# Patient Record
Sex: FEMALE | Race: WHITE | NOT HISPANIC OR LATINO | Employment: FULL TIME | URBAN - METROPOLITAN AREA
[De-identification: names, ages, dates, MRNs, and addresses within clinical notes are randomized per-mention and may not be internally consistent; named-entity substitution may affect disease eponyms.]

---

## 2020-12-23 ENCOUNTER — IMMUNIZATION (OUTPATIENT)
Dept: VACCINE CLINIC | Facility: HOSPITAL | Age: 60
End: 2020-12-23

## 2020-12-23 PROCEDURE — 91300 HC SARSCOV02 VAC 30MCG/0.3ML IM: CPT

## 2020-12-23 PROCEDURE — 0001A: CPT

## 2021-01-13 ENCOUNTER — IMMUNIZATION (OUTPATIENT)
Dept: VACCINE CLINIC | Facility: HOSPITAL | Age: 61
End: 2021-01-13

## 2021-01-13 PROCEDURE — 91300 HC SARSCOV02 VAC 30MCG/0.3ML IM: CPT

## 2021-01-13 PROCEDURE — 0002A: CPT

## 2021-01-13 PROCEDURE — 0001A: CPT

## 2021-06-21 ENCOUNTER — HOSPITAL ENCOUNTER (OUTPATIENT)
Dept: GENERAL RADIOLOGY | Facility: HOSPITAL | Age: 61
Discharge: HOME | End: 2021-06-21

## 2021-06-21 ENCOUNTER — LAB (OUTPATIENT)
Dept: LAB | Facility: HOSPITAL | Age: 61
End: 2021-06-21

## 2021-06-21 ENCOUNTER — OFFICE VISIT (OUTPATIENT)
Dept: INTERNAL MEDICINE | Facility: CLINIC | Age: 61
End: 2021-06-21

## 2021-06-21 VITALS
OXYGEN SATURATION: 98 % | SYSTOLIC BLOOD PRESSURE: 126 MMHG | WEIGHT: 191.4 LBS | HEART RATE: 70 BPM | TEMPERATURE: 97.3 F | BODY MASS INDEX: 32.68 KG/M2 | DIASTOLIC BLOOD PRESSURE: 82 MMHG | HEIGHT: 64 IN

## 2021-06-21 DIAGNOSIS — E66.9 OBESITY (BMI 30-39.9): ICD-10-CM

## 2021-06-21 DIAGNOSIS — Z79.899 HIGH RISK MEDICATIONS (NOT ANTICOAGULANTS) LONG-TERM USE: ICD-10-CM

## 2021-06-21 DIAGNOSIS — Z12.11 COLON CANCER SCREENING: ICD-10-CM

## 2021-06-21 DIAGNOSIS — Z13.220 SCREENING CHOLESTEROL LEVEL: ICD-10-CM

## 2021-06-21 DIAGNOSIS — Z12.39 ENCOUNTER FOR SCREENING FOR MALIGNANT NEOPLASM OF BREAST, UNSPECIFIED SCREENING MODALITY: ICD-10-CM

## 2021-06-21 DIAGNOSIS — M25.561 CHRONIC PAIN OF RIGHT KNEE: ICD-10-CM

## 2021-06-21 DIAGNOSIS — M25.511 ACUTE PAIN OF RIGHT SHOULDER: ICD-10-CM

## 2021-06-21 DIAGNOSIS — E55.9 VITAMIN D DEFICIENCY: ICD-10-CM

## 2021-06-21 DIAGNOSIS — G89.29 CHRONIC PAIN OF RIGHT KNEE: Primary | ICD-10-CM

## 2021-06-21 DIAGNOSIS — G89.29 CHRONIC PAIN OF RIGHT KNEE: ICD-10-CM

## 2021-06-21 DIAGNOSIS — M25.561 CHRONIC PAIN OF RIGHT KNEE: Primary | ICD-10-CM

## 2021-06-21 LAB
25(OH)D3 SERPL-MCNC: 13.6 NG/ML
ALBUMIN SERPL-MCNC: 4.5 G/DL (ref 3.5–5.2)
ALBUMIN/GLOB SERPL: 1.2 G/DL
ALP SERPL-CCNC: 52 U/L (ref 39–117)
ALT SERPL W P-5'-P-CCNC: 16 U/L (ref 1–33)
ANION GAP SERPL CALCULATED.3IONS-SCNC: 10.4 MMOL/L (ref 5–15)
AST SERPL-CCNC: 19 U/L (ref 1–32)
BILIRUB SERPL-MCNC: 0.8 MG/DL (ref 0–1.2)
BUN SERPL-MCNC: 14 MG/DL (ref 6–20)
BUN/CREAT SERPL: 14.7 (ref 7–25)
CALCIUM SPEC-SCNC: 9.9 MG/DL (ref 8.6–10.5)
CHLORIDE SERPL-SCNC: 101 MMOL/L (ref 98–107)
CHOLEST SERPL-MCNC: 200 MG/DL (ref 0–200)
CO2 SERPL-SCNC: 25.6 MMOL/L (ref 22–29)
CREAT SERPL-MCNC: 0.95 MG/DL (ref 0.57–1)
DEPRECATED RDW RBC AUTO: 44.8 FL (ref 37–54)
ERYTHROCYTE [DISTWIDTH] IN BLOOD BY AUTOMATED COUNT: 14.6 % (ref 12.3–15.4)
GFR SERPL CREATININE-BSD FRML MDRD: 60 ML/MIN/1.73
GLOBULIN UR ELPH-MCNC: 3.7 GM/DL
GLUCOSE SERPL-MCNC: 95 MG/DL (ref 65–99)
HBA1C MFR BLD: 5.3 % (ref 4.8–5.6)
HCT VFR BLD AUTO: 46.2 % (ref 34–46.6)
HDLC SERPL-MCNC: 55 MG/DL (ref 40–60)
HGB BLD-MCNC: 14.5 G/DL (ref 12–15.9)
LDLC SERPL CALC-MCNC: 132 MG/DL (ref 0–100)
LDLC/HDLC SERPL: 2.37 {RATIO}
MCH RBC QN AUTO: 26.3 PG (ref 26.6–33)
MCHC RBC AUTO-ENTMCNC: 31.4 G/DL (ref 31.5–35.7)
MCV RBC AUTO: 83.8 FL (ref 79–97)
PLATELET # BLD AUTO: 245 10*3/MM3 (ref 140–450)
PMV BLD AUTO: 11.2 FL (ref 6–12)
POTASSIUM SERPL-SCNC: 4.5 MMOL/L (ref 3.5–5.2)
PROT SERPL-MCNC: 8.2 G/DL (ref 6–8.5)
RBC # BLD AUTO: 5.51 10*6/MM3 (ref 3.77–5.28)
SODIUM SERPL-SCNC: 137 MMOL/L (ref 136–145)
TRIGL SERPL-MCNC: 74 MG/DL (ref 0–150)
TSH SERPL DL<=0.05 MIU/L-ACNC: 1.23 UIU/ML (ref 0.27–4.2)
VLDLC SERPL-MCNC: 13 MG/DL (ref 5–40)
WBC # BLD AUTO: 6.95 10*3/MM3 (ref 3.4–10.8)

## 2021-06-21 PROCEDURE — 80053 COMPREHEN METABOLIC PANEL: CPT

## 2021-06-21 PROCEDURE — 73562 X-RAY EXAM OF KNEE 3: CPT

## 2021-06-21 PROCEDURE — 36415 COLL VENOUS BLD VENIPUNCTURE: CPT

## 2021-06-21 PROCEDURE — 80061 LIPID PANEL: CPT

## 2021-06-21 PROCEDURE — 99204 OFFICE O/P NEW MOD 45 MIN: CPT | Performed by: INTERNAL MEDICINE

## 2021-06-21 PROCEDURE — 83036 HEMOGLOBIN GLYCOSYLATED A1C: CPT

## 2021-06-21 PROCEDURE — 82306 VITAMIN D 25 HYDROXY: CPT

## 2021-06-21 PROCEDURE — 85027 COMPLETE CBC AUTOMATED: CPT

## 2021-06-21 PROCEDURE — 73030 X-RAY EXAM OF SHOULDER: CPT

## 2021-06-21 PROCEDURE — 84443 ASSAY THYROID STIM HORMONE: CPT

## 2021-06-21 RX ORDER — IBUPROFEN 600 MG/1
600 TABLET ORAL DAILY PRN
COMMUNITY
End: 2022-08-15 | Stop reason: HOSPADM

## 2021-06-21 NOTE — PROGRESS NOTES
Chief Complaint  Establish Care (new patient), Shoulder Pain (right shoulder pain sx x 4 - 6 weeks), and Knee Pain (right knee pain sx x 4-6 weeks)    Subjective          Alyssia Moody presents to [facility]  History of Present Illness    New pt here to establish. H/o:    R shoulder pain x 4-6 weeks at insertion of biceps. Pain w/ any ROM.  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  Fell off horse yesterday so aggravated it more. She is woorried about adhesive capsulitis, as she had that in her left shoulder and this feels very similar.  She did receive injections in her left shoulder but does not think she would like to do that on the right side.  Mainly she wants to get an x-ray to make sure there are no bony lesions  She had seen Dr. Welch for her left shoulder in the past.    R knee pain- medial tenderness- hurts to walk, especially initially, but then once she has walked a short distance the pain improves.  She does use a knee brace which helps.  Does have some nighttime pain as well.  She had left knee ACL repair years ago but she thinks she reinjured the ACL shortly after the surgery so she feels she is put more pressure on the right knee for many years because of the left knee problems    She uses ibuprofen 400 bid usually, occasionally 800 bid and this tends to work.  She has done this for many years    Mildly elevated blood pressure but has never had to be on blood pressure medication    Migraines - better w/ menopause, food related now, but does not get very often    She is not on any vitamins or supplements.     Went through menopause around age 51.  Previous Paps were normal but has been 5 or so years since her last one.  Also has not had a mammogram in about 5 years     Obesity -she has struggled with her weight on and off through the years and has been able to lose weight but then tends to regain.  Over the last 4  "to 6 weeks, she tries to walk 2miles a day, also rides horses regularly. Diet - not great.  drinks diet coke.   Has done wt watchers in past which was helpful        Current Outpatient Medications:   •  ibuprofen (ADVIL,MOTRIN) 600 MG tablet, 600 mg Daily As Needed., Disp: , Rfl:       Objective   Vital Signs:   /82 (BP Location: Left arm, Patient Position: Sitting)   Pulse 70   Temp 97.3 °F (36.3 °C) (Infrared)   Ht 162.1 cm (63.8\")   Wt 86.8 kg (191 lb 6.4 oz)   SpO2 98%   BMI 33.06 kg/m²       Physical exam  Constitutional: oriented to person, place, and time.  well-developed and well-nourished. No distress.   HENT:   Head: Normocephalic and atraumatic.   Eyes: Conjunctivae and EOM are normal.   Cardiovascular: Normal rate, regular rhythm and normal heart sounds.  Exam reveals no gallop and no friction rub.    No murmur heard.  Pulmonary/Chest: Effort normal and breath sounds normal. No respiratory distress.   no wheezes.   Neurological:  alert and oriented to person, place, and time.   MS: Right shoulder-tender at biceps insertion.  Very limited active and passive range of motion.  Right knee tender medially and patient unable to extend to 180 degrees  Skin: Skin is warm and dry. not diaphoretic.   Psychiatric:  normal mood and affect. behavior is normal. Judgment and thought content normal.      Result Review :                   Assessment and Plan    Diagnoses and all orders for this visit:    1. Chronic pain of right knee (Primary)  Comments:  We will check x-ray and discussed whether we should get her into orthopedics but she would like to see results of the x-ray first.  Orders:  -     XR Knee 3 View Right; Future    2. Acute pain of right shoulder  Comments:  Probable adhesive capsulitis.  Discussed Ortho referral but she wants to hold on this but get the x-ray  Orders:  -     XR Shoulder 2+ View Right; Future    3. Obesity (BMI 30-39.9)  Comments:  Discussed healthy diet and suggested trying " the No ap  Orders:  -     TSH Rfx On Abnormal To Free T4; Future  -     Lipid Panel; Future  -     Hemoglobin A1c; Future    4. Screening cholesterol level  -     Lipid Panel; Future    5. High risk medications (not anticoagulants) long-term use  -     Comprehensive Metabolic Panel; Future  -     CBC (No Diff); Future    6. Vitamin D deficiency  -     Vitamin D 25 Hydroxy; Future    7. Encounter for screening for malignant neoplasm of breast, unspecified screening modality  -     Mammo Screening Digital Tomosynthesis Bilateral With CAD; Future    8. Colon cancer screening  -     Cologuard - Stool, Per Rectum; Future        Follow Up   Return in about 6 months (around 12/21/2021) for Annual.  Patient was given instructions and counseling regarding her condition or for health maintenance advice. Please see specific information pulled into the AVS if appropriate.     Prev:  Soham- we will order  Pap smear-due and suggested she schedule here  Colonoscopy-she does not want to do a colonoscopy but is willing to do Cologuard.  We discussed this is not as good a test as a colonoscopy and she expresses understanding  Tetanus vaccine-she thinks she has had shot in the last 10 years  shingrix discussed -  can check at pharmacy since we do not have   She had the Covid vaccines

## 2021-07-28 ENCOUNTER — HOSPITAL ENCOUNTER (OUTPATIENT)
Dept: MAMMOGRAPHY | Facility: HOSPITAL | Age: 61
Discharge: HOME | End: 2021-07-28
Admitting: INTERNAL MEDICINE

## 2021-07-28 DIAGNOSIS — Z12.39 ENCOUNTER FOR SCREENING FOR MALIGNANT NEOPLASM OF BREAST, UNSPECIFIED SCREENING MODALITY: ICD-10-CM

## 2021-07-28 PROCEDURE — 77063 BREAST TOMOSYNTHESIS BI: CPT | Performed by: RADIOLOGY

## 2021-07-28 PROCEDURE — 77067 SCR MAMMO BI INCL CAD: CPT | Performed by: RADIOLOGY

## 2021-07-28 PROCEDURE — 77067 SCR MAMMO BI INCL CAD: CPT

## 2021-07-28 PROCEDURE — 77063 BREAST TOMOSYNTHESIS BI: CPT

## 2021-12-18 NOTE — PROGRESS NOTES
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 Knees have improved.     R frozen shoulder - has improved some    Vitamin D deficiecny- her D was 13 in 6/21. She is not on any vit D          Current Outpatient Medications:   •  ibuprofen (ADVIL,MOTRIN) 200 MG tablet, Take 600 mg by mouth Every 6 (Six) Hours As Needed for Mild Pain ., Disp: , Rfl:   •  ibuprofen (ADVIL,MOTRIN) 600 MG tablet, 600 mg Daily As Needed., Disp: , Rfl:     The following portions of the patient's history were reviewed and updated as appropriate: allergies, current medications, past family history, past medical history, past social history, past surgical history and problem list.    Review of Systems   Constitutional: Positive for unexpected weight loss (intentional). Negative for activity change, appetite change, fever and unexpected weight gain.   HENT: Negative.    Eyes: Negative.    Respiratory: Negative for shortness of breath and wheezing.    Cardiovascular: Negative for chest pain, palpitations and leg swelling.   Gastrointestinal: Negative.    Endocrine: Negative.    Genitourinary: Negative for difficulty urinating, dysuria and urinary incontinence.   Musculoskeletal: Positive for arthralgias.   Skin: Negative.    Allergic/Immunologic: Negative for immunocompromised state.   Neurological: Negative for seizures, speech difficulty, memory problem and confusion.   Hematological: Does not bruise/bleed easily.   Psychiatric/Behavioral: Negative for agitation.         Objective    /72 (BP Location: Right arm, Patient Position: Sitting)   Pulse 80   Temp 97.1 °F (36.2 °C) (Infrared)   Ht 162.6 cm (64\")   Wt 78.9 kg (174 lb)   SpO2 99%   BMI 29.87 kg/m²   Physical Exam   Physical Exam  Vitals and nursing note reviewed.   Constitutional: "       General: She is not in acute distress.     Appearance: She is well-developed. She is not diaphoretic.   HENT:      Head: Normocephalic and atraumatic.      Right Ear: External ear normal.      Left Ear: External ear normal.      Nose: Nose normal.      Mouth/Throat:      Pharynx: No oropharyngeal exudate.   Eyes:      General: No scleral icterus.        Right eye: No discharge.         Left eye: No discharge.      Conjunctiva/sclera: Conjunctivae normal.      Pupils: Pupils are equal, round, and reactive to light.   Neck:      Thyroid: No thyromegaly.   Cardiovascular:      Rate and Rhythm: Normal rate and regular rhythm.      Heart sounds: Normal heart sounds. No murmur heard.  No friction rub. No gallop.    Pulmonary:      Effort: Pulmonary effort is normal. No respiratory distress.      Breath sounds: Normal breath sounds. No wheezing or rales.   Chest:   Breasts:      Right: No mass, nipple discharge, skin change or tenderness.      Left: No mass, nipple discharge, skin change or tenderness.       Abdominal:      General: Bowel sounds are normal. There is no distension.      Palpations: Abdomen is soft. There is no mass.      Tenderness: There is no abdominal tenderness. There is no guarding or rebound.   Genitourinary:     Vagina: Normal. No vaginal discharge.      Rectum: Guaiac result negative.   Musculoskeletal:         General: No deformity. Normal range of motion.      Cervical back: Normal range of motion and neck supple.   Lymphadenopathy:      Cervical: No cervical adenopathy.   Skin:     General: Skin is warm and dry.      Coloration: Skin is not pale.      Findings: No erythema or rash.   Neurological:      Mental Status: She is alert and oriented to person, place, and time.      Coordination: Coordination normal.      Deep Tendon Reflexes: Reflexes normal.   Psychiatric:         Behavior: Behavior normal.         Thought Content: Thought content normal.         Judgment: Judgment normal.            Assessment/Plan   Diagnoses and all orders for this visit:    1. Routine general medical examination at a health care facility (Primary)/Routine gynecological examination  -     POC Occult Blood Stool  -     Liquid-based Pap Smear, Screening; Future  Regular exercise/healthy diet. BSE q month. Sunscreen use encouraged. calcium intake reviewed.   Soham due 7/22  Colon - she had cologuard this year, repeat in 3 yrs  DT -she has had in the last 10 yrs  DEXA due, has never had, we will schedule  Shingles - shingrix discussed -  can check at pharmacy since we do not have   covid-had booster  Flu- had  She will see derm for skin checkup, lots of sun exposure when she was younger  -     POC Urinalysis Dipstick, Automated  -     POC Occult Blood Stool  -     Liquid-based Pap Smear, Screening; Future    2. Vitamin D deficiency- would start 2000 iu of D3 daily

## 2021-12-23 ENCOUNTER — OFFICE VISIT (OUTPATIENT)
Dept: INTERNAL MEDICINE | Facility: CLINIC | Age: 61
End: 2021-12-23

## 2021-12-23 VITALS
BODY MASS INDEX: 29.71 KG/M2 | TEMPERATURE: 97.1 F | WEIGHT: 174 LBS | HEIGHT: 64 IN | HEART RATE: 80 BPM | DIASTOLIC BLOOD PRESSURE: 72 MMHG | SYSTOLIC BLOOD PRESSURE: 116 MMHG | OXYGEN SATURATION: 99 %

## 2021-12-23 DIAGNOSIS — Z00.00 ROUTINE GENERAL MEDICAL EXAMINATION AT A HEALTH CARE FACILITY: Primary | ICD-10-CM

## 2021-12-23 DIAGNOSIS — Z13.820 SCREENING FOR OSTEOPOROSIS: ICD-10-CM

## 2021-12-23 DIAGNOSIS — E55.9 VITAMIN D DEFICIENCY: ICD-10-CM

## 2021-12-23 DIAGNOSIS — Z01.419 ROUTINE GYNECOLOGICAL EXAMINATION: ICD-10-CM

## 2021-12-23 LAB
BILIRUB BLD-MCNC: NEGATIVE MG/DL
CLARITY, POC: CLEAR
COLOR UR: YELLOW
DEVELOPER EXPIRATION DATE: 0
DEVELOPER LOT NUMBER: 0
EXPIRATION DATE: 0
EXPIRATION DATE: ABNORMAL
FECAL OCCULT BLOOD SCREEN, POC: NEGATIVE
GLUCOSE UR STRIP-MCNC: NEGATIVE MG/DL
KETONES UR QL: NEGATIVE
LEUKOCYTE EST, POC: NEGATIVE
Lab: 0
Lab: ABNORMAL
NEGATIVE CONTROL: NEGATIVE
NITRITE UR-MCNC: NEGATIVE MG/ML
PH UR: 5.5 [PH] (ref 5–8)
POSITIVE CONTROL: POSITIVE
PROT UR STRIP-MCNC: NEGATIVE MG/DL
RBC # UR STRIP: ABNORMAL /UL
SP GR UR: 1.03 (ref 1–1.03)
UROBILINOGEN UR QL: NORMAL

## 2021-12-23 PROCEDURE — 99396 PREV VISIT EST AGE 40-64: CPT | Performed by: INTERNAL MEDICINE

## 2021-12-23 PROCEDURE — 82270 OCCULT BLOOD FECES: CPT | Performed by: INTERNAL MEDICINE

## 2021-12-23 PROCEDURE — 81003 URINALYSIS AUTO W/O SCOPE: CPT | Performed by: INTERNAL MEDICINE

## 2021-12-23 RX ORDER — IBUPROFEN 200 MG
800 TABLET ORAL 2 TIMES DAILY
COMMUNITY

## 2022-04-01 ENCOUNTER — HOSPITAL ENCOUNTER (OUTPATIENT)
Dept: BONE DENSITY | Facility: HOSPITAL | Age: 62
Discharge: HOME | End: 2022-04-01
Admitting: INTERNAL MEDICINE

## 2022-04-01 DIAGNOSIS — Z13.820 SCREENING FOR OSTEOPOROSIS: ICD-10-CM

## 2022-04-01 PROCEDURE — 77080 DXA BONE DENSITY AXIAL: CPT

## 2022-08-14 ENCOUNTER — APPOINTMENT (OUTPATIENT)
Dept: CT IMAGING | Facility: HOSPITAL | Age: 62
End: 2022-08-14

## 2022-08-14 ENCOUNTER — HOSPITAL ENCOUNTER (OUTPATIENT)
Facility: HOSPITAL | Age: 62
Setting detail: OBSERVATION
Discharge: HOME | End: 2022-08-15
Attending: EMERGENCY MEDICINE | Admitting: INTERNAL MEDICINE
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
## 2022-08-14 DIAGNOSIS — Z86.79 HISTORY OF HYPERTENSION: ICD-10-CM

## 2022-08-14 DIAGNOSIS — S00.83XA CONTUSION OF FACE, INITIAL ENCOUNTER: ICD-10-CM

## 2022-08-14 DIAGNOSIS — R55 VASOVAGAL SYNCOPE: ICD-10-CM

## 2022-08-14 DIAGNOSIS — S06.5X0A SUBDURAL HEMATOMA DUE TO CONCUSSION, WITHOUT LOSS OF CONSCIOUSNESS, INITIAL ENCOUNTER: ICD-10-CM

## 2022-08-14 DIAGNOSIS — S30.0XXA TRAUMATIC HEMATOMA OF BUTTOCK, INITIAL ENCOUNTER: ICD-10-CM

## 2022-08-14 DIAGNOSIS — S09.90XA TRAUMATIC INJURY OF HEAD, INITIAL ENCOUNTER: ICD-10-CM

## 2022-08-14 DIAGNOSIS — S10.93XA CONTUSION OF NECK, INITIAL ENCOUNTER: ICD-10-CM

## 2022-08-14 PROBLEM — R07.89 MUSCULOSKELETAL CHEST PAIN: Status: ACTIVE | Noted: 2022-08-14

## 2022-08-14 PROBLEM — T14.8XXA HEMATOMA OF MUSCLE: Status: ACTIVE | Noted: 2022-08-14

## 2022-08-14 PROBLEM — S06.5XAA SDH (SUBDURAL HEMATOMA): Status: ACTIVE | Noted: 2022-08-14

## 2022-08-14 LAB
ALBUMIN SERPL-MCNC: 4.6 G/DL (ref 3.5–5.2)
ALBUMIN/GLOB SERPL: 1.4 G/DL
ALP SERPL-CCNC: 58 U/L (ref 39–117)
ALT SERPL W P-5'-P-CCNC: 19 U/L (ref 1–33)
ANION GAP SERPL CALCULATED.3IONS-SCNC: 13 MMOL/L (ref 5–15)
AST SERPL-CCNC: 24 U/L (ref 1–32)
BASOPHILS # BLD AUTO: 0.04 10*3/MM3 (ref 0–0.2)
BASOPHILS NFR BLD AUTO: 0.2 % (ref 0–1.5)
BILIRUB SERPL-MCNC: 0.5 MG/DL (ref 0–1.2)
BUN SERPL-MCNC: 23 MG/DL (ref 8–23)
BUN/CREAT SERPL: 23 (ref 7–25)
CALCIUM SPEC-SCNC: 9.3 MG/DL (ref 8.6–10.5)
CHLORIDE SERPL-SCNC: 105 MMOL/L (ref 98–107)
CO2 SERPL-SCNC: 20 MMOL/L (ref 22–29)
CREAT SERPL-MCNC: 1 MG/DL (ref 0.57–1)
DEPRECATED RDW RBC AUTO: 42.6 FL (ref 37–54)
EGFRCR SERPLBLD CKD-EPI 2021: 64.2 ML/MIN/1.73
EOSINOPHIL # BLD AUTO: 0.01 10*3/MM3 (ref 0–0.4)
EOSINOPHIL NFR BLD AUTO: 0.1 % (ref 0.3–6.2)
ERYTHROCYTE [DISTWIDTH] IN BLOOD BY AUTOMATED COUNT: 14.5 % (ref 12.3–15.4)
GLOBULIN UR ELPH-MCNC: 3.3 GM/DL
GLUCOSE SERPL-MCNC: 119 MG/DL (ref 65–99)
HCT VFR BLD AUTO: 34.8 % (ref 34–46.6)
HCT VFR BLD AUTO: 40 % (ref 34–46.6)
HGB BLD-MCNC: 11.6 G/DL (ref 12–15.9)
HGB BLD-MCNC: 13.1 G/DL (ref 12–15.9)
IMM GRANULOCYTES # BLD AUTO: 0.13 10*3/MM3 (ref 0–0.05)
IMM GRANULOCYTES NFR BLD AUTO: 0.8 % (ref 0–0.5)
LYMPHOCYTES # BLD AUTO: 1.41 10*3/MM3 (ref 0.7–3.1)
LYMPHOCYTES NFR BLD AUTO: 8.5 % (ref 19.6–45.3)
MCH RBC QN AUTO: 26.6 PG (ref 26.6–33)
MCHC RBC AUTO-ENTMCNC: 32.8 G/DL (ref 31.5–35.7)
MCV RBC AUTO: 81.1 FL (ref 79–97)
MONOCYTES # BLD AUTO: 0.79 10*3/MM3 (ref 0.1–0.9)
MONOCYTES NFR BLD AUTO: 4.8 % (ref 5–12)
NEUTROPHILS NFR BLD AUTO: 14.25 10*3/MM3 (ref 1.7–7)
NEUTROPHILS NFR BLD AUTO: 85.6 % (ref 42.7–76)
NRBC BLD AUTO-RTO: 0 /100 WBC (ref 0–0.2)
PLATELET # BLD AUTO: 235 10*3/MM3 (ref 140–450)
PMV BLD AUTO: 9.7 FL (ref 6–12)
POTASSIUM SERPL-SCNC: 4 MMOL/L (ref 3.5–5.2)
PROT SERPL-MCNC: 7.9 G/DL (ref 6–8.5)
RBC # BLD AUTO: 4.93 10*6/MM3 (ref 3.77–5.28)
SODIUM SERPL-SCNC: 138 MMOL/L (ref 136–145)
TROPONIN T SERPL-MCNC: <0.01 NG/ML (ref 0–0.03)
WBC NRBC COR # BLD: 16.63 10*3/MM3 (ref 3.4–10.8)

## 2022-08-14 PROCEDURE — 72128 CT CHEST SPINE W/O DYE: CPT

## 2022-08-14 PROCEDURE — 85025 COMPLETE CBC W/AUTO DIFF WBC: CPT | Performed by: PHYSICIAN ASSISTANT

## 2022-08-14 PROCEDURE — 85018 HEMOGLOBIN: CPT | Performed by: PHYSICIAN ASSISTANT

## 2022-08-14 PROCEDURE — G0378 HOSPITAL OBSERVATION PER HR: HCPCS

## 2022-08-14 PROCEDURE — 72131 CT LUMBAR SPINE W/O DYE: CPT

## 2022-08-14 PROCEDURE — 72125 CT NECK SPINE W/O DYE: CPT

## 2022-08-14 PROCEDURE — 84484 ASSAY OF TROPONIN QUANT: CPT | Performed by: PHYSICIAN ASSISTANT

## 2022-08-14 PROCEDURE — 25010000002 IOPAMIDOL 61 % SOLUTION: Performed by: EMERGENCY MEDICINE

## 2022-08-14 PROCEDURE — 70450 CT HEAD/BRAIN W/O DYE: CPT

## 2022-08-14 PROCEDURE — 70486 CT MAXILLOFACIAL W/O DYE: CPT

## 2022-08-14 PROCEDURE — 85014 HEMATOCRIT: CPT | Performed by: PHYSICIAN ASSISTANT

## 2022-08-14 PROCEDURE — 99220 PR INITIAL OBSERVATION CARE/DAY 70 MINUTES: CPT | Performed by: FAMILY MEDICINE

## 2022-08-14 PROCEDURE — 93005 ELECTROCARDIOGRAM TRACING: CPT | Performed by: PHYSICIAN ASSISTANT

## 2022-08-14 PROCEDURE — 36415 COLL VENOUS BLD VENIPUNCTURE: CPT

## 2022-08-14 PROCEDURE — 74177 CT ABD & PELVIS W/CONTRAST: CPT

## 2022-08-14 PROCEDURE — 71260 CT THORAX DX C+: CPT

## 2022-08-14 PROCEDURE — 80053 COMPREHEN METABOLIC PANEL: CPT | Performed by: PHYSICIAN ASSISTANT

## 2022-08-14 PROCEDURE — 99284 EMERGENCY DEPT VISIT MOD MDM: CPT

## 2022-08-14 RX ORDER — SODIUM CHLORIDE 0.9 % (FLUSH) 0.9 %
10 SYRINGE (ML) INJECTION AS NEEDED
Status: DISCONTINUED | OUTPATIENT
Start: 2022-08-14 | End: 2022-08-15 | Stop reason: HOSPADM

## 2022-08-14 RX ADMIN — IOPAMIDOL 95 ML: 612 INJECTION, SOLUTION INTRAVENOUS at 21:19

## 2022-08-14 NOTE — ED PROVIDER NOTES
Subjective   This is a 62-year-old female that presents the ER after traumatic fall off of a horse approximately 3 hours ago.  Patient says that the horse was attempting to make a jump and then stopped abruptly.  Patient was thrown off the horse and landed on her back and then proceeded to strike her head on the hard ground.  She was wearing a helmet and denied any loss of consciousness.  Patient reports low back pain as well as pain to the right buttocks and her right buttocks is significantly enlarged compared to the left with tightness and induration.  She also has some bruising to the mid forehead and some localized tenderness to the anterior chest wall.  She denies any pain to the upper or lower extremities.  She reports a stinging sensation to her pelvis and buttocks and when that sensation occurs, she rates her pain an 8 out of 10.  She is able to ambulate.  She denies any urinary or bowel changes or incontinence.  Patient denies any abdominal pain.  She does not take any daily aspirin or other chronic anticoagulation.  She does take ibuprofen intermittently for pain related to osteoarthritis.  Past medical history is significant for osteoarthritis, depression, migraines, hypertension, and GERD.  Patient denies any pain with movement of the jaw but feels like her teeth are not coming together correctly.      History provided by:  Patient and spouse  Trauma  Mechanism of injury: Fall off of a horse and fall  Incident location: outdoors  Time since incident: 3 hours  Arrived directly from scene: no     Fall:       Fall occurred: Follow occurred when patient was riding a horse and it attempted to jump but then stopped abruptly.  Patient was thrown off the horse and landed onto her back and right side.       Height of fall: 5 feet       Impact surface: The hard ground.       Point of impact: back and head (Patient hit her back and right buttocks and then proceeded to strike her head against the  ground)    Protective equipment:        Helmet.        Suspicion of alcohol use: no       Suspicion of drug use: no    EMS/PTA data:       Bystander interventions: none       Ambulatory at scene: yes       Blood loss: none       Oriented to: person       Loss of consciousness: no       Amnesic to event: no       Airway interventions: none       Breathing interventions: none       IV access: none       IO access: none       Cardiac interventions: none       Medications administered: none    Current symptoms:       Quality: Patient describes a stinging sensation to her pelvis and buttocks.       Associated symptoms:             Reports back pain (Mid and lower back pain as well as pelvic pain) and chest pain (Anterior chest pain near the sternum).             Denies abdominal pain, difficulty breathing, headache, loss of consciousness, nausea, neck pain, seizures and vomiting.     Relevant PMH:       Medical risk factors:             No chronic anticoagulation.  Patient takes ibuprofen routinely for pain related to osteoarthritis.       Pharmacological risk factors:             No anticoagulation therapy.       Review of Systems   Constitutional: Negative.  Negative for chills and fatigue.   HENT: Negative.    Eyes: Negative.  Negative for visual disturbance.   Respiratory: Negative.  Negative for chest tightness and shortness of breath.    Cardiovascular: Positive for chest pain (Anterior chest pain near the sternum). Negative for palpitations and leg swelling.   Gastrointestinal: Negative.  Negative for abdominal distention, abdominal pain, nausea and vomiting.   Genitourinary: Negative.  Negative for enuresis and flank pain.   Musculoskeletal: Positive for arthralgias (Righ pelvic pain and pain to the right buttocks.  Pain to the anterior chest wall.) and back pain (Mid and lower back pain as well as pelvic pain). Negative for neck pain.   Skin: Positive for color change (Patient has bruising to the mid forehead as  well as significant swelling to the right buttocks as compared to the left with firmness and induration, suspect hematoma.).   Neurological: Negative.  Negative for dizziness, seizures, loss of consciousness, syncope, facial asymmetry, speech difficulty and headaches.   All other systems reviewed and are negative.      Past Medical History:   Diagnosis Date   • Acid reflux    • Depression    • Fracture     right elbow and left tibial plateau   • H/O mammogram    • Hypertension    • Migraine    • Obesity    • Osteoarthritis    • Pap smear for cervical cancer screening     been about 5 years or more       No Known Allergies    Past Surgical History:   Procedure Laterality Date   • KNEE ARTHROSCOPY Bilateral    • KNEE SURGERY Left ,        Family History   Problem Relation Age of Onset   • Arthritis Mother    • Migraines Mother    • Stroke Mother    • Dementia Mother 75        Lewy Body,  age 80   • Breast cancer Sister 55   • Diabetes Maternal Grandfather    • Heart attack Maternal Grandfather    • Arthritis Maternal Grandmother    • Ovarian cancer Neg Hx        Social History     Socioeconomic History   • Marital status:    Tobacco Use   • Smoking status: Never Smoker   • Smokeless tobacco: Never Used   Substance and Sexual Activity   • Alcohol use: Not Currently     Comment: rare   • Drug use: Never   • Sexual activity: Defer           Objective   Physical Exam  Vitals and nursing note reviewed.   Constitutional:       General: She is not in acute distress.     Appearance: Normal appearance.      Comments: Patient appears uncomfortable in a seated position due to significant swelling and suspected hematoma of the right buttocks.   HENT:      Head: Normocephalic. Contusion present. No abrasion.      Jaw: There is normal jaw occlusion. No tenderness, pain on movement or malocclusion.        Comments: No sign of scalp hematoma or swelling.  Scalp is nontender to palpation.  There is a small  superficial bruise to the mid forehead which patient says is from striking the horse.  She was wearing a helmet.  No pops or clicks or evidence of malocclusion with palpation of bilateral TMJs, although patient says she does not feel like her teeth come together appropriately.  There is no obvious abnormality.     Right Ear: Tympanic membrane normal.      Left Ear: Tympanic membrane normal.      Nose: Nose normal.      Mouth/Throat:      Mouth: Mucous membranes are moist.      Pharynx: Oropharynx is clear.   Eyes:      Extraocular Movements: Extraocular movements intact.      Right eye: Normal extraocular motion and no nystagmus.      Left eye: Normal extraocular motion and no nystagmus.      Conjunctiva/sclera: Conjunctivae normal.      Pupils: Pupils are equal, round, and reactive to light.      Comments: Pupils equal round reactive to light.  Extraocular movements intact.   Neck:        Comments: No particular C-spine tenderness.  Full range of motion.  Cardiovascular:      Rate and Rhythm: Regular rhythm. Tachycardia present.  No extrasystoles are present.     Pulses: Normal pulses.      Heart sounds: Normal heart sounds.      Comments: Tachycardia.  No ectopy.  Pulmonary:      Effort: Pulmonary effort is normal. No tachypnea or accessory muscle usage.      Breath sounds: Normal breath sounds. No decreased air movement. No decreased breath sounds.      Comments: No tachypnea or retractions.  Lungs are clear to auscultation bilaterally.  No decreased breath sounds concerning for pneumothorax.  Chest:      Chest wall: Tenderness present. No deformity, swelling or crepitus.       Abdominal:      General: Bowel sounds are normal. There is no distension. There are no signs of injury.      Palpations: Abdomen is soft.      Tenderness: There is no abdominal tenderness. There is no right CVA tenderness, left CVA tenderness, guarding or rebound.      Comments: Abdomen soft and nontender.  No flank or CVA tenderness.  No  bruising to the abdominal wall.  Abdominal exam is benign.   Musculoskeletal:         General: Normal range of motion.      Cervical back: Normal range of motion and neck supple. No spinous process tenderness or muscular tenderness.      Comments: There is no C-spine tenderness.  Full range of motion.  Mild mid to lower thoracic spinal tenderness.  No bruising or sign of trauma.  Tenderness to the mid lower lumbar spine and right buttocks.  Large swelling and induration to the right buttocks as compared to the left, suspect large hematoma.  No tenderness to the right or left hip or groin.  Full range of motion all 4 extremities without any bony tenderness.   Skin:     General: Skin is warm and dry.      Findings: Bruising and ecchymosis present.          Neurological:      General: No focal deficit present.      Mental Status: She is alert.      Cranial Nerves: Cranial nerves are intact.      Sensory: Sensation is intact.      Motor: Motor function is intact.      Coordination: Coordination is intact.      Comments: Neuro intact and nonfocal.         Critical Care  Performed by: Yelena Faith PA-C  Authorized by: Martha Zuniga MD     Critical care provider statement:     Critical care time (minutes):  40    Critical care time was exclusive of:  Separately billable procedures and treating other patients    Critical care was necessary to treat or prevent imminent or life-threatening deterioration of the following conditions:  CNS failure or compromise and trauma    Critical care was time spent personally by me on the following activities:  Blood draw for specimens, development of treatment plan with patient or surrogate, discussions with consultants, examination of patient, obtaining history from patient or surrogate, review of old charts, re-evaluation of patient's condition, pulse oximetry, ordering and review of radiographic studies, ordering and review of laboratory studies, ordering and performing  treatments and interventions and evaluation of patient's response to treatment               ED Course  ED Course as of 08/15/22 0044   Sun Aug 14, 2022   2003 Discussed the case with Dr. Zuniga, ER attending physician.  We will proceed with full trauma work-up. [FC]   2225 Discussed abnormal results with Radiologist on CT of the head without contrast. [FC]   2226 Discussed the case with Raciel Barry, Neurosurgery PA.  He is reviewing films. [FC]   2246 Pt updated on all results and recommendations for admission.  She deferred on admission but then she had episode of decreased responsiveness.  HR went to 42.  /123.  Patient came to after about 15 seconds.  Dr. Zuniga came to the bedside.  Second IV was established.  Repeat H&H will be drawn. [FC]   2250 Repeat BP 94/67.  HR 68 now.  Giving NS IV fluid bolus.  Will prepare patient for admission and contact Dr. Borjas, hospitalist. [FC]   2347 Repeat H&H is 11.6 and 34.8 from 13 and 40.  Suspect that this is related to large hematoma to the right buttocks. [FC]   Mon Aug 15, 2022   0036 Discussed admission with Dr. Borjas, hospitalist.  Patient receiving IV fluids and upon reassessment, she is feeling markedly improved.  Suspect vasovagal syncope with hypotension and clinical presentation.  Patient is now agreeable to admission.  Neurosurgery will consult due to small subdural hematoma.  Patient is ready for admission on telemetry. [FC]   0040 CT of the C, T, and LS spine revealed no acute compression fracture or subluxation.  CT of the chest with contrast revealed no acute intrathoracic injury or pneumothorax.  CT of the abdomen/pelvis with contrast revealed no acute intra-abdominal or pelvic organ injury.  There was evidence of subcutaneous hematoma to the right buttocks.  CT of the facial bones revealed no acute facial bone fracture.  There was no malalignment of the jaw. [FC]      ED Course User Index  [FC] Yelena Faith, PAYunielC           Recent Results (from the  past 24 hour(s))   Comprehensive Metabolic Panel    Collection Time: 08/14/22  8:09 PM    Specimen: Blood   Result Value Ref Range    Glucose 119 (H) 65 - 99 mg/dL    BUN 23 8 - 23 mg/dL    Creatinine 1.00 0.57 - 1.00 mg/dL    Sodium 138 136 - 145 mmol/L    Potassium 4.0 3.5 - 5.2 mmol/L    Chloride 105 98 - 107 mmol/L    CO2 20.0 (L) 22.0 - 29.0 mmol/L    Calcium 9.3 8.6 - 10.5 mg/dL    Total Protein 7.9 6.0 - 8.5 g/dL    Albumin 4.60 3.50 - 5.20 g/dL    ALT (SGPT) 19 1 - 33 U/L    AST (SGOT) 24 1 - 32 U/L    Alkaline Phosphatase 58 39 - 117 U/L    Total Bilirubin 0.5 0.0 - 1.2 mg/dL    Globulin 3.3 gm/dL    A/G Ratio 1.4 g/dL    BUN/Creatinine Ratio 23.0 7.0 - 25.0    Anion Gap 13.0 5.0 - 15.0 mmol/L    eGFR 64.2 >60.0 mL/min/1.73   Troponin    Collection Time: 08/14/22  8:09 PM    Specimen: Blood   Result Value Ref Range    Troponin T <0.010 0.000 - 0.030 ng/mL   CBC Auto Differential    Collection Time: 08/14/22  8:09 PM    Specimen: Blood   Result Value Ref Range    WBC 16.63 (H) 3.40 - 10.80 10*3/mm3    RBC 4.93 3.77 - 5.28 10*6/mm3    Hemoglobin 13.1 12.0 - 15.9 g/dL    Hematocrit 40.0 34.0 - 46.6 %    MCV 81.1 79.0 - 97.0 fL    MCH 26.6 26.6 - 33.0 pg    MCHC 32.8 31.5 - 35.7 g/dL    RDW 14.5 12.3 - 15.4 %    RDW-SD 42.6 37.0 - 54.0 fl    MPV 9.7 6.0 - 12.0 fL    Platelets 235 140 - 450 10*3/mm3    Neutrophil % 85.6 (H) 42.7 - 76.0 %    Lymphocyte % 8.5 (L) 19.6 - 45.3 %    Monocyte % 4.8 (L) 5.0 - 12.0 %    Eosinophil % 0.1 (L) 0.3 - 6.2 %    Basophil % 0.2 0.0 - 1.5 %    Immature Grans % 0.8 (H) 0.0 - 0.5 %    Neutrophils, Absolute 14.25 (H) 1.70 - 7.00 10*3/mm3    Lymphocytes, Absolute 1.41 0.70 - 3.10 10*3/mm3    Monocytes, Absolute 0.79 0.10 - 0.90 10*3/mm3    Eosinophils, Absolute 0.01 0.00 - 0.40 10*3/mm3    Basophils, Absolute 0.04 0.00 - 0.20 10*3/mm3    Immature Grans, Absolute 0.13 (H) 0.00 - 0.05 10*3/mm3    nRBC 0.0 0.0 - 0.2 /100 WBC   ECG 12 Lead    Collection Time: 08/14/22  8:13 PM   Result  Value Ref Range    QT Interval 342 ms    QTC Interval 429 ms   Hemoglobin & Hematocrit, Blood    Collection Time: 08/14/22 11:11 PM    Specimen: Blood   Result Value Ref Range    Hemoglobin 11.6 (L) 12.0 - 15.9 g/dL    Hematocrit 34.8 34.0 - 46.6 %     Note: In addition to lab results from this visit, the labs listed above may include labs taken at another facility or during a different encounter within the last 24 hours. Please correlate lab times with ED admission and discharge times for further clarification of the services performed during this visit.    CT Chest With Contrast Diagnostic   Final Result   Impression:   1. Right gluteal contusion and subcutaneous hematoma.   2. No other acute traumatic injury.   3. Diverticulosis without acute diverticulitis.      Electronically signed by:  Giovani Inman M.D.     8/14/2022 7:54 PM Mountain Time      CT Abdomen Pelvis With Contrast   Final Result   Impression:   1. Right gluteal contusion and subcutaneous hematoma.   2. No other acute traumatic injury.   3. Diverticulosis without acute diverticulitis.      Electronically signed by:  Giovani Inman M.D.     8/14/2022 7:54 PM Mountain Time      CT Head Without Contrast   Final Result      Head CT:       1.  Thin (4 mm) subdural hematoma along the posterior falx without significant mass effect.          Facial CT:      1.  No acute facial fracture.         Cervical spine CT:      1.  No acute fracture or malalignment in the cervical spine.          Results were discussed with ELIE HUGO via phone at 8/14/2022 8:11 PM by Dr. Alves.  A read back of two patient identifiers and impression occurred.      Electronically signed by:  Jeff Alves     8/14/2022 8:20 PM Mountain Time      CT Facial Bones Without Contrast   Final Result      Head CT:       1.  Thin (4 mm) subdural hematoma along the posterior falx without significant mass effect.          Facial CT:      1.  No acute facial fracture.         Cervical  spine CT:      1.  No acute fracture or malalignment in the cervical spine.          Results were discussed with ELIE HUGO via phone at 8/14/2022 8:11 PM by Dr. Alves.  A read back of two patient identifiers and impression occurred.      Electronically signed by:  Jeff Alves     8/14/2022 8:20 PM Mountain Time      CT Cervical Spine Without Contrast   Final Result      Head CT:       1.  Thin (4 mm) subdural hematoma along the posterior falx without significant mass effect.          Facial CT:      1.  No acute facial fracture.         Cervical spine CT:      1.  No acute fracture or malalignment in the cervical spine.          Results were discussed with ELIE HUGO via phone at 8/14/2022 8:11 PM by Dr. Alves.  A read back of two patient identifiers and impression occurred.      Electronically signed by:  Jeff Alves     8/14/2022 8:20 PM Mountain Time      CT Thoracic Spine Without Contrast   Final Result      No acute fracture or traumatic subluxation in the thoracic spine.               Electronically signed by:  Maynor Hernandez M.D.     8/14/2022 8:02 PM Mountain Time      CT Lumbar Spine Without Contrast   Final Result      No acute fracture or traumatic subluxation in the lumbar spine.      Electronically signed by:  Maynor Hernandez M.D.     8/14/2022 8:08 PM Mountain Time        Vitals:    08/14/22 2345 08/14/22 2346 08/14/22 2347 08/15/22 0009   BP: 116/65   121/64   BP Location:    Right arm   Patient Position:    Lying   Pulse:  70 80 75   Resp:    18   Temp:    97.6 °F (36.4 °C)   TempSrc:    Oral   SpO2:   96% 95%   Weight:       Height:         Medications   sodium chloride 0.9 % flush 10 mL (has no administration in time range)   sodium chloride 0.9 % flush 10 mL (10 mL Intravenous Not Given 8/15/22 0016)   sodium chloride 0.9 % flush 10 mL (has no administration in time range)   aluminum-magnesium hydroxide-simethicone (MAALOX MAX) 400-400-40 MG/5ML suspension 15 mL (has no  administration in time range)   ondansetron (ZOFRAN) tablet 4 mg (has no administration in time range)     Or   ondansetron (ZOFRAN) injection 4 mg (has no administration in time range)   sodium chloride 0.9 % bolus 1,000 mL (1,000 mL Intravenous New Bag 8/15/22 0032)   iopamidol (ISOVUE-300) 61 % injection 100 mL (95 mL Intravenous Given 8/14/22 2119)     ECG/EMG Results (last 24 hours)     ** No results found for the last 24 hours. **        ECG 12 Lead   Preliminary Result   Test Reason : chest wall pain s/p trauma   Blood Pressure :   */*   mmHG   Vent. Rate :  95 BPM     Atrial Rate :  95 BPM      P-R Int : 112 ms          QRS Dur :  84 ms       QT Int : 342 ms       P-R-T Axes :  10  41  22 degrees      QTc Int : 429 ms      Normal sinus rhythm   Normal ECG   No previous ECGs available      Referred By: EDMD           Confirmed By:                                           MDM    Final diagnoses:   Fall from horse, initial encounter   Traumatic injury of head, initial encounter   Subdural hematoma due to concussion, without loss of consciousness, initial encounter ([facility])   Traumatic hematoma of buttock, initial encounter   Contusion of neck, initial encounter   Contusion of face, initial encounter   History of hypertension   Vasovagal syncope       ED Disposition  ED Disposition     ED Disposition   Decision to Admit    Condition   --    Comment   Level of Care: Telemetry [5]   Diagnosis: SDH (subdural hematoma) ([facility]) [[id number]]   Admitting Physician: YENI VILLANUEVA [[id number]]   Attending Physician: YENI VILLANUEVA [[id number]]               No follow-up provider specified.       Medication List      No changes were made to your prescriptions during this visit.          Yelena Faith PA-C  08/15/22 0044

## 2022-08-15 ENCOUNTER — READMISSION MANAGEMENT (OUTPATIENT)
Dept: CALL CENTER | Facility: HOSPITAL | Age: 62
End: 2022-08-15

## 2022-08-15 VITALS
HEART RATE: 88 BPM | TEMPERATURE: 97.9 F | OXYGEN SATURATION: 94 % | DIASTOLIC BLOOD PRESSURE: 71 MMHG | BODY MASS INDEX: 29.02 KG/M2 | SYSTOLIC BLOOD PRESSURE: 125 MMHG | WEIGHT: 170 LBS | HEIGHT: 64 IN | RESPIRATION RATE: 18 BRPM

## 2022-08-15 PROBLEM — D62 ACUTE BLOOD LOSS ANEMIA: Status: ACTIVE | Noted: 2022-08-15

## 2022-08-15 LAB
HCT VFR BLD AUTO: 32.4 % (ref 34–46.6)
HGB BLD-MCNC: 10.3 G/DL (ref 12–15.9)
SARS-COV-2 RNA PNL SPEC NAA+PROBE: NOT DETECTED

## 2022-08-15 PROCEDURE — C9803 HOPD COVID-19 SPEC COLLECT: HCPCS | Performed by: FAMILY MEDICINE

## 2022-08-15 PROCEDURE — 99217 PR OBSERVATION CARE DISCHARGE MANAGEMENT: CPT | Performed by: INTERNAL MEDICINE

## 2022-08-15 PROCEDURE — U0004 COV-19 TEST NON-CDC HGH THRU: HCPCS | Performed by: FAMILY MEDICINE

## 2022-08-15 PROCEDURE — 99203 OFFICE O/P NEW LOW 30 MIN: CPT | Performed by: PHYSICIAN ASSISTANT

## 2022-08-15 PROCEDURE — G0378 HOSPITAL OBSERVATION PER HR: HCPCS

## 2022-08-15 PROCEDURE — 85014 HEMATOCRIT: CPT | Performed by: FAMILY MEDICINE

## 2022-08-15 PROCEDURE — 85018 HEMOGLOBIN: CPT | Performed by: FAMILY MEDICINE

## 2022-08-15 RX ORDER — TROLAMINE SALICYLATE 10 G/100G
1 CREAM TOPICAL 4 TIMES DAILY PRN
Status: DISCONTINUED | OUTPATIENT
Start: 2022-08-15 | End: 2022-08-15 | Stop reason: HOSPADM

## 2022-08-15 RX ORDER — OXYCODONE HYDROCHLORIDE 5 MG/1
5 TABLET ORAL EVERY 4 HOURS PRN
Status: DISCONTINUED | OUTPATIENT
Start: 2022-08-15 | End: 2022-08-15 | Stop reason: HOSPADM

## 2022-08-15 RX ORDER — ONDANSETRON 2 MG/ML
4 INJECTION INTRAMUSCULAR; INTRAVENOUS EVERY 6 HOURS PRN
Status: DISCONTINUED | OUTPATIENT
Start: 2022-08-15 | End: 2022-08-15 | Stop reason: HOSPADM

## 2022-08-15 RX ORDER — SODIUM CHLORIDE 0.9 % (FLUSH) 0.9 %
10 SYRINGE (ML) INJECTION EVERY 12 HOURS SCHEDULED
Status: DISCONTINUED | OUTPATIENT
Start: 2022-08-15 | End: 2022-08-15 | Stop reason: HOSPADM

## 2022-08-15 RX ORDER — TRAMADOL HYDROCHLORIDE 50 MG/1
75 TABLET ORAL 3 TIMES DAILY
Status: DISCONTINUED | OUTPATIENT
Start: 2022-08-15 | End: 2022-08-15 | Stop reason: HOSPADM

## 2022-08-15 RX ORDER — ONDANSETRON 4 MG/1
4 TABLET, FILM COATED ORAL EVERY 6 HOURS PRN
Status: DISCONTINUED | OUTPATIENT
Start: 2022-08-15 | End: 2022-08-15 | Stop reason: HOSPADM

## 2022-08-15 RX ORDER — ALUMINA, MAGNESIA, AND SIMETHICONE 2400; 2400; 240 MG/30ML; MG/30ML; MG/30ML
15 SUSPENSION ORAL EVERY 6 HOURS PRN
Status: DISCONTINUED | OUTPATIENT
Start: 2022-08-15 | End: 2022-08-15 | Stop reason: HOSPADM

## 2022-08-15 RX ORDER — MORPHINE SULFATE 2 MG/ML
2 INJECTION, SOLUTION INTRAMUSCULAR; INTRAVENOUS EVERY 4 HOURS PRN
Status: DISCONTINUED | OUTPATIENT
Start: 2022-08-15 | End: 2022-08-15 | Stop reason: HOSPADM

## 2022-08-15 RX ORDER — ACETAMINOPHEN 325 MG/1
650 TABLET ORAL EVERY 6 HOURS PRN
Status: DISCONTINUED | OUTPATIENT
Start: 2022-08-15 | End: 2022-08-15 | Stop reason: HOSPADM

## 2022-08-15 RX ORDER — ACETAMINOPHEN 500 MG
1000 TABLET ORAL 3 TIMES DAILY
Status: DISCONTINUED | OUTPATIENT
Start: 2022-08-15 | End: 2022-08-15 | Stop reason: HOSPADM

## 2022-08-15 RX ORDER — SODIUM CHLORIDE 0.9 % (FLUSH) 0.9 %
10 SYRINGE (ML) INJECTION AS NEEDED
Status: DISCONTINUED | OUTPATIENT
Start: 2022-08-15 | End: 2022-08-15 | Stop reason: HOSPADM

## 2022-08-15 RX ADMIN — Medication 10 ML: at 09:15

## 2022-08-15 RX ADMIN — SODIUM CHLORIDE 1000 ML: 9 INJECTION, SOLUTION INTRAVENOUS at 00:32

## 2022-08-15 RX ADMIN — TRAMADOL HYDROCHLORIDE 75 MG: 50 TABLET ORAL at 09:11

## 2022-08-15 RX ADMIN — ACETAMINOPHEN 650 MG: 325 TABLET, FILM COATED ORAL at 05:07

## 2022-08-15 NOTE — NURSING NOTE
Pt arrived to unit in stable condition, vital sings stable, no confusion, nausea, blurry vision, sob. Bolus 0.9 NS running as directed. Will monitor patient overnight for any changes.     Thank you,     LS

## 2022-08-15 NOTE — OUTREACH NOTE
Prep Survey    Flowsheet Row Responses   [facility] facility patient discharged from? [facility]   Is LACE score < 7 ? Yes   Emergency Room discharge w/ pulse ox? No   Eligibility [facility]   Date of Admission 08/14/22   Date of Discharge 08/15/22   Discharge Disposition Home or Self Care   Discharge diagnosis Subdural hematoma   Does the patient have one of the following disease processes/diagnoses(primary or secondary)? Other   Does the patient have Home health ordered? No   Is there a DME ordered? No   Prep survey completed? Yes          MYRNA FLORES - Registered Nurse

## 2022-08-15 NOTE — H&P
[facility] Medicine Services  HISTORY AND PHYSICAL    Patient Name: Alyssia Moody  : 1960  MRN: [id number]  Primary Care Physician: Hailee Doss MD  Date of admission: 2022      Subjective   Subjective     Chief Complaint:  Fall form horse    HPI:  Alyssia Moody is a 62 y.o. female presented to [facility] ED after acute mechanical fall from horse during riding.  Patient was thrown onto her back, hit occiput to ground without LOC, immediately had stinging and pain in R>L buttock area.  No SOA, loss of bladder/bowel, numbness or focal weakness, vision changes.  Currently just feels very stiff/sore.  Has forehead contusion and chin contusion from horse.     ED evaluation with 4mm SDH without neurologic deficit, right gluteal hematoma, and reactive leukocytosis. Presyncopal spell while in ED, no prodrome and VSS.     Review of Systems   Gen- No fevers, chills, HA, uncontrolled pain  CV- No substernal chest pain, palpitations, new edema  Resp- No SOA, cough  GI- No N/V/D    All other systems reviewed and are negative.     Personal History     Past Medical History:   Diagnosis Date   • Acid reflux    • Depression    • Fracture     right elbow and left tibial plateau   • H/O mammogram    • Hypertension    • Migraine    • Obesity    • Osteoarthritis    • Pap smear for cervical cancer screening     been about 5 years or more             Past Surgical History:   Procedure Laterality Date   • KNEE ARTHROSCOPY Bilateral    • KNEE SURGERY Left ,        Family History:  family history includes Arthritis in her maternal grandmother and mother; Breast cancer (age of onset: 55) in her sister; Dementia (age of onset: 75) in her mother; Diabetes in her maternal grandfather; Heart attack in her maternal grandfather; Migraines in her mother; Stroke in her mother. Otherwise pertinent FHx was reviewed and unremarkable.     Social History:  reports that she has never smoked. She has  never used smokeless tobacco. She reports previous alcohol use. She reports that she does not use drugs.  Social History     Social History Narrative   • Not on file       Medications:  Available home medication information reviewed.  (Not in a hospital admission)      No Known Allergies    Objective   Objective     Vital Signs:   Temp:  [97.8 °F (36.6 °C)] 97.8 °F (36.6 °C)  Heart Rate:  [] 80  Resp:  [18] 18  BP: ()/(60-95) 101/60       Physical Exam   Constitutional: No acute distress, awake, alert, nontoxic  Eyes:No conjunctival hemmorrhage  HENT: mid forehead soft tissue contusion, early ecchymosis of left inframandibular area  Respiratory: On RA, good effort, nonlabored respirations   Cardiovascular: NSR tele  Musculoskeletal: No peripheral edema, normal muscle tone for age  Psychiatric: Appropriate affect, good insight and judgement, cooperative  Neurologic: Oriented x 3, movements symmetric BUE and BLE, Cranial Nerves grossly intact, speech clear and fluent      LAB RESULTS:      Lab 08/14/22 2009   WBC 16.63*   HEMOGLOBIN 13.1   HEMATOCRIT 40.0   PLATELETS 235   NEUTROS ABS 14.25*   IMMATURE GRANS (ABS) 0.13*   LYMPHS ABS 1.41   MONOS ABS 0.79   EOS ABS 0.01   MCV 81.1         Lab 08/14/22 2009   SODIUM 138   POTASSIUM 4.0   CHLORIDE 105   CO2 20.0*   ANION GAP 13.0   BUN 23   CREATININE 1.00   EGFR 64.2   GLUCOSE 119*   CALCIUM 9.3         Lab 08/14/22 2009   TOTAL PROTEIN 7.9   ALBUMIN 4.60   GLOBULIN 3.3   ALT (SGPT) 19   AST (SGOT) 24   BILIRUBIN 0.5   ALK PHOS 58         Lab 08/14/22 2009   TROPONIN T <0.010                 UA    Urinalysis 12/23/21   Ketones, UA Negative   Leukocytes, UA Negative             Microbiology Results (last 10 days)     ** No results found for the last 240 hours. **          CT Head Without Contrast    Result Date: 8/14/2022  EXAMINATION: CT HEAD WO CONTRAST, CT CERVICAL SPINE WO CONTRAST, CT FACIAL BONES WO CONTRAST DATE: 8/14/2022 9:04 PM  INDICATION: Head  trauma, thrown off horse  COMPARISON: None available. TECHNIQUE: Thin section noncontrast axial images were obtained through the head. Coronal reformatted images were created. CT dose lowering techniques were used, to include: automated exposure control, adjustment for patient size, and or use of iterative reconstruction FINDINGS: Bones and extracranial soft tissues: Calvarium is intact. Paranasal sinuses and mastoid air cells are essentially clear. Intracranial contents: Thin subdural hematoma along the posterior falx measuring 4 mm in thickness. Gray-white differentiation is preserved. There is age-appropriate whole brain atrophy.  Basal cisterns are patent. No hemorrhage, extra-axial collection, or hydrocephalus. No CT evidence of acute ischemia. No mass or mass effect. TECHNIQUE: Thin section axial noncontrast images were obtained through the facial bones.  Sagittal and coronal reformatted images were created.  Images were reviewed in bone and soft tissue windows.  CT dose lowering techniques were used, to include: automated exposure control, adjustment for patient size, and or use of iterative reconstruction. FINDINGS: Bones: No evidence of acute facial fracture. Mandible is intact and the condyles located. Soft tissues: Facial soft tissues are unremarkable. Globes and orbits are grossly normal. TECHNIQUE: Thin section axial noncontrast images were obtained through the cervical spine.  Sagittal and coronal reformatted images were created.  Images were reviewed in bone and soft tissue windows. CT dose lowering techniques were used, to include: automated exposure control, adjustment for patient size, and or use of iterative reconstruction. FINDINGS:  Vertebral column: Straightening of the normal cervical lordosis with mild degenerative subluxations. Alignment of the craniocervical junction is preserved. No acute fracture. Vertebral body heights are maintained. Normal bone mineralization. Multilevel cervical  spondylosis worse at C5-6 where uncovertebral joint hypertrophy contributes to moderate left-sided neural foraminal stenosis. No significant bony spinal canal stenosis. Soft tissues: Lung apices are clear. Cervical soft tissues are unremarkable.     Impression: Head CT: 1.  Thin (4 mm) subdural hematoma along the posterior falx without significant mass effect. Facial CT: 1.  No acute facial fracture. Cervical spine CT: 1.  No acute fracture or malalignment in the cervical spine. Results were discussed with ELIE HUGO via phone at 8/14/2022 8:11 PM by Dr. Alves.  A read back of two patient identifiers and impression occurred. Electronically signed by:  Jeff Alves  8/14/2022 8:20 PM Mountain Time    CT Chest With Contrast Diagnostic    Result Date: 8/14/2022  CT CHEST, ABDOMEN AND PELVIS WITH CONTRAST CLINICAL INDICATION: Trauma. COMPARISON: None. TECHNIQUE: 95 cc Isovue-300 administered intravenously. CT images of the chest, abdomen and pelvis were obtained. CT dose lowering techniques were used, to include: automated exposure control, adjustment for patient size, and or use of iterative reconstruction. FINDINGS: Cardiovascular: Heart size normal without pericardial effusion. Mediastinal vessels normal in caliber and patency. Mediastinum: No mediastinal or hilar lymphadenopathy. Trachea and central airways are patent. Thyroid is normal. Esophagus is normal. Lungs and pleura: Lungs are clear without focal consolidation, pleural effusion or pneumothorax. Liver and biliary system: The liver is normal in size and configuration without focal lesion. No intra or extrahepatic biliary ductal dilatation is noted. The gallbladder is normal without stones, wall thickening or adjacent fluid. Pancreas: The pancreas is unremarkable. Spleen: The spleen is normal. Adrenals: The adrenal glands are within normal limits. Kidneys: The kidneys are symmetric in size and enhancement and without focal lesion or hydronephrosis.  Gastrointestinal: The stomach and scattered loops of small and large bowel have a nonobstructive pattern. Diverticulosis. Mesentery and retroperitoneum: No mesenteric or retroperitoneal adenopathy. No abnormal fluid collection, mass or free air. Pelvis: The urinary bladder is moderately distended with a smooth contour. Rectum is normal. No free fluid. No deep pelvic or inguinal adenopathy. Reproductive: No acute abnormality. Body wall: Subcutaneous contusion and hyperdense fluid are seen in the right gluteal region. Bones: No acute osseous abnormality. Thoracolumbar vertebral body height, alignment and disc spaces are maintained throughout.     Impression: Impression: 1. Right gluteal contusion and subcutaneous hematoma. 2. No other acute traumatic injury. 3. Diverticulosis without acute diverticulitis. Electronically signed by:  Giovani Inman M.D.  8/14/2022 7:54 PM Mountain Time    CT Cervical Spine Without Contrast    Result Date: 8/14/2022  EXAMINATION: CT HEAD WO CONTRAST, CT CERVICAL SPINE WO CONTRAST, CT FACIAL BONES WO CONTRAST DATE: 8/14/2022 9:04 PM  INDICATION: Head trauma, thrown off horse  COMPARISON: None available. TECHNIQUE: Thin section noncontrast axial images were obtained through the head. Coronal reformatted images were created. CT dose lowering techniques were used, to include: automated exposure control, adjustment for patient size, and or use of iterative reconstruction FINDINGS: Bones and extracranial soft tissues: Calvarium is intact. Paranasal sinuses and mastoid air cells are essentially clear. Intracranial contents: Thin subdural hematoma along the posterior falx measuring 4 mm in thickness. Gray-white differentiation is preserved. There is age-appropriate whole brain atrophy.  Basal cisterns are patent. No hemorrhage, extra-axial collection, or hydrocephalus. No CT evidence of acute ischemia. No mass or mass effect. TECHNIQUE: Thin section axial noncontrast images were obtained through  the facial bones.  Sagittal and coronal reformatted images were created.  Images were reviewed in bone and soft tissue windows.  CT dose lowering techniques were used, to include: automated exposure control, adjustment for patient size, and or use of iterative reconstruction. FINDINGS: Bones: No evidence of acute facial fracture. Mandible is intact and the condyles located. Soft tissues: Facial soft tissues are unremarkable. Globes and orbits are grossly normal. TECHNIQUE: Thin section axial noncontrast images were obtained through the cervical spine.  Sagittal and coronal reformatted images were created.  Images were reviewed in bone and soft tissue windows. CT dose lowering techniques were used, to include: automated exposure control, adjustment for patient size, and or use of iterative reconstruction. FINDINGS:  Vertebral column: Straightening of the normal cervical lordosis with mild degenerative subluxations. Alignment of the craniocervical junction is preserved. No acute fracture. Vertebral body heights are maintained. Normal bone mineralization. Multilevel cervical spondylosis worse at C5-6 where uncovertebral joint hypertrophy contributes to moderate left-sided neural foraminal stenosis. No significant bony spinal canal stenosis. Soft tissues: Lung apices are clear. Cervical soft tissues are unremarkable.     Impression: Head CT: 1.  Thin (4 mm) subdural hematoma along the posterior falx without significant mass effect. Facial CT: 1.  No acute facial fracture. Cervical spine CT: 1.  No acute fracture or malalignment in the cervical spine. Results were discussed with ELIE HUGO via phone at 8/14/2022 8:11 PM by Dr. Alves.  A read back of two patient identifiers and impression occurred. Electronically signed by:  Jeff Alves  8/14/2022 8:20 PM Mountain Time    CT Thoracic Spine Without Contrast    Result Date: 8/14/2022  EXAMINATION: CT THORACIC SPINE WO CONTRAST  8/14/2022 9:04 PM INDICATIONS: Back  pain, thrown from horse COMPARISON: None TECHNIQUE: Axial noncontrast imaging throughout the thoracic spine followed by coronal and sagittal reformats. CT dose lowering techniques were used, to include: automated exposure control, adjustment for patient size, and/or use of iterative reconstruction. FINDINGS: Vertebral body height and alignment is preserved. Normal thoracic kyphosis is maintained. Mild generalized osteopenia. Minimal degenerative changes. No high-grade canal stenosis.     Impression: No acute fracture or traumatic subluxation in the thoracic spine. Electronically signed by:  Maynor Hernandez M.D.  8/14/2022 8:02 PM Mountain Time    CT Lumbar Spine Without Contrast    Result Date: 8/14/2022  EXAMINATION: CT of the lumbar spine without contrast DATE: 8/14/2022 9:04 PM INDICATION: Low back pain, thrown from horse COMPARISON: None TECHNIQUE: Thin section axial noncontrast images were obtained through the lumbar spine.  Sagittal and coronal reformatted images were created.  Images were reviewed in bone and soft tissue windows.  CT dose lowering techniques were used, to include: automated exposure control, adjustment for patient size, and or use of iterative reconstruction.? FINDINGS: 5 nonrib-bearing lumbar type vertebral bodies. Normal vertebral body height. Normal alignment. Mild osteopenia. Normal lumbar lordosis is maintained. No acute fracture or traumatic subluxation. DEGENERATIVE CHANGES: Mild degenerative disc disease and facet arthropathy. No high-grade canal stenosis. VISUALIZED INTRA-ABDOMINAL COMPARTMENT: No acute abnormality.     Impression: No acute fracture or traumatic subluxation in the lumbar spine. Electronically signed by:  Maynor Hernandez M.D.  8/14/2022 8:08 PM Mountain Time    CT Abdomen Pelvis With Contrast    Result Date: 8/14/2022  CT CHEST, ABDOMEN AND PELVIS WITH CONTRAST CLINICAL INDICATION: Trauma. COMPARISON: None. TECHNIQUE: 95 cc Isovue-300 administered intravenously. CT  images of the chest, abdomen and pelvis were obtained. CT dose lowering techniques were used, to include: automated exposure control, adjustment for patient size, and or use of iterative reconstruction. FINDINGS: Cardiovascular: Heart size normal without pericardial effusion. Mediastinal vessels normal in caliber and patency. Mediastinum: No mediastinal or hilar lymphadenopathy. Trachea and central airways are patent. Thyroid is normal. Esophagus is normal. Lungs and pleura: Lungs are clear without focal consolidation, pleural effusion or pneumothorax. Liver and biliary system: The liver is normal in size and configuration without focal lesion. No intra or extrahepatic biliary ductal dilatation is noted. The gallbladder is normal without stones, wall thickening or adjacent fluid. Pancreas: The pancreas is unremarkable. Spleen: The spleen is normal. Adrenals: The adrenal glands are within normal limits. Kidneys: The kidneys are symmetric in size and enhancement and without focal lesion or hydronephrosis. Gastrointestinal: The stomach and scattered loops of small and large bowel have a nonobstructive pattern. Diverticulosis. Mesentery and retroperitoneum: No mesenteric or retroperitoneal adenopathy. No abnormal fluid collection, mass or free air. Pelvis: The urinary bladder is moderately distended with a smooth contour. Rectum is normal. No free fluid. No deep pelvic or inguinal adenopathy. Reproductive: No acute abnormality. Body wall: Subcutaneous contusion and hyperdense fluid are seen in the right gluteal region. Bones: No acute osseous abnormality. Thoracolumbar vertebral body height, alignment and disc spaces are maintained throughout.     Impression: Impression: 1. Right gluteal contusion and subcutaneous hematoma. 2. No other acute traumatic injury. 3. Diverticulosis without acute diverticulitis. Electronically signed by:  Giovani Inman M.D.  8/14/2022 7:54 PM Mountain Time    CT Facial Bones Without  Contrast    Result Date: 8/14/2022  EXAMINATION: CT HEAD WO CONTRAST, CT CERVICAL SPINE WO CONTRAST, CT FACIAL BONES WO CONTRAST DATE: 8/14/2022 9:04 PM  INDICATION: Head trauma, thrown off horse  COMPARISON: None available. TECHNIQUE: Thin section noncontrast axial images were obtained through the head. Coronal reformatted images were created. CT dose lowering techniques were used, to include: automated exposure control, adjustment for patient size, and or use of iterative reconstruction FINDINGS: Bones and extracranial soft tissues: Calvarium is intact. Paranasal sinuses and mastoid air cells are essentially clear. Intracranial contents: Thin subdural hematoma along the posterior falx measuring 4 mm in thickness. Gray-white differentiation is preserved. There is age-appropriate whole brain atrophy.  Basal cisterns are patent. No hemorrhage, extra-axial collection, or hydrocephalus. No CT evidence of acute ischemia. No mass or mass effect. TECHNIQUE: Thin section axial noncontrast images were obtained through the facial bones.  Sagittal and coronal reformatted images were created.  Images were reviewed in bone and soft tissue windows.  CT dose lowering techniques were used, to include: automated exposure control, adjustment for patient size, and or use of iterative reconstruction. FINDINGS: Bones: No evidence of acute facial fracture. Mandible is intact and the condyles located. Soft tissues: Facial soft tissues are unremarkable. Globes and orbits are grossly normal. TECHNIQUE: Thin section axial noncontrast images were obtained through the cervical spine.  Sagittal and coronal reformatted images were created.  Images were reviewed in bone and soft tissue windows. CT dose lowering techniques were used, to include: automated exposure control, adjustment for patient size, and or use of iterative reconstruction. FINDINGS:  Vertebral column: Straightening of the normal cervical lordosis with mild degenerative  subluxations. Alignment of the craniocervical junction is preserved. No acute fracture. Vertebral body heights are maintained. Normal bone mineralization. Multilevel cervical spondylosis worse at C5-6 where uncovertebral joint hypertrophy contributes to moderate left-sided neural foraminal stenosis. No significant bony spinal canal stenosis. Soft tissues: Lung apices are clear. Cervical soft tissues are unremarkable.     Impression: Head CT: 1.  Thin (4 mm) subdural hematoma along the posterior falx without significant mass effect. Facial CT: 1.  No acute facial fracture. Cervical spine CT: 1.  No acute fracture or malalignment in the cervical spine. Results were discussed with ELIE HUGO via phone at 8/14/2022 8:11 PM by Dr. Alves.  A read back of two patient identifiers and impression occurred. Electronically signed by:  Jeff Alves  8/14/2022 8:20 PM Mountain Time          Assessment & Plan   Assessment & Plan     Active Hospital Problems    Diagnosis  POA   • SDH (subdural hematoma) (HCC) [S06.5X9A]  Yes   • Hematoma of gluteal muscle [T14.8XXA]  Yes   • Fall from horse [V80.010A]  Not Applicable   • Musculoskeletal chest pain [R07.89]  Yes       S/p fall from horse  SDH  Right gluteal hematoma  -repeat non-con CT head tomorrow afternoon for comparison  -q4h neuro checks  -not chronically anticoagulated  -supportive care for hematoma and musculoskeletal pain  -- TID scheduled HD Tylenol/Ultram, prn oxycodone/aspercreme    Vasovagal episode in ED  -situational, do not feel related to SDH  -IVF's overnight  -orthostatic vitals in am     **Plan for d/c home tomorrow if comparison CT head acceptable**      DVT prophylaxis:  SCDs      CODE STATUS:  FULL  There are no questions and answers to display.         Darlene Borjas MD  08/14/22

## 2022-08-15 NOTE — CONSULTS
Consults    Referring Provider: Jeffrey HOPE    Patient Care Team:  Hailee Doss MD as PCP - General (Internal Medicine)    Chief Complaint: Low back pain right SI pain    Subjective .     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  Patient is an experience horseback rider but was trying to train a new horse that halted before going over a jump which threw her forward hitting the horse and causing some trauma on her forehead and chin.  Patient fell off the horse and fell flat on her back and hit the back of her head.  Secondary to the rapidly evolving hematoma in the right SI area patient was brought to the emergency department.  Injury happened at 4 the CT scan was done 6 hours after the initial injury.  This showed a minuscule falx subdural hematoma.    I discussed with the ED that she likely could go home because it is so small and we had a remote scan with her being totally neurologically intact but she had a vasovagal in the ED and was then admitted.    Upon evaluation today she denies any headache or neurologic symptom.  Patient does complain of some right SI pain where she has a pretty large bruise and some swelling she feels as though she likely dislocated her SI joint which I think is a reasonable assessment considering her fall.     Other than the subdural noted on the CT patient had a pretty extensive work-up with having the axial spine as well as abdomen pelvis and chest scanned the only other issue noted was the right gluteal hematoma.  No obvious breaks or fractures in the axial spine.    Review of Systems   Constitutional: Negative for activity change, appetite change, chills, fatigue and fever.   HENT: Negative for congestion, dental problem, ear pain, hearing loss, sinus pressure and tinnitus.    Eyes: Negative for pain and redness.   Respiratory: Negative for apnea, cough, shortness of breath and wheezing.     Cardiovascular: Negative for chest pain, palpitations and leg swelling.   Gastrointestinal: Negative for abdominal distention, abdominal pain, blood in stool, constipation, diarrhea, nausea and vomiting.   Endocrine: Negative for cold intolerance, heat intolerance and polyuria.   Genitourinary: Negative for enuresis, frequency and urgency.   Musculoskeletal: Positive for back pain.   Skin: Negative for color change and rash.   Neurological: Negative for dizziness, tremors, seizures, syncope, speech difficulty, weakness, light-headedness, numbness and headaches.   Psychiatric/Behavioral: Negative for behavioral problems and confusion. The patient is not nervous/anxious.        History  Past Medical History:   Diagnosis Date   • Acid reflux    • Depression    • Fracture     right elbow and left tibial plateau   • H/O mammogram    • Hypertension    • Migraine    • Obesity    • Osteoarthritis    • Pap smear for cervical cancer screening     been about 5 years or more   ,   Past Surgical History:   Procedure Laterality Date   • KNEE ARTHROSCOPY Bilateral    • KNEE SURGERY Left ,    ,   Family History   Problem Relation Age of Onset   • Arthritis Mother    • Migraines Mother    • Stroke Mother    • Dementia Mother 75        Lewy Body,  age 80   • Breast cancer Sister 55   • Diabetes Maternal Grandfather    • Heart attack Maternal Grandfather    • Arthritis Maternal Grandmother    • Ovarian cancer Neg Hx    ,   Social History     Socioeconomic History   • Marital status:    Tobacco Use   • Smoking status: Never Smoker   • Smokeless tobacco: Never Used   Substance and Sexual Activity   • Alcohol use: Not Currently     Comment: rare   • Drug use: Never   • Sexual activity: Defer     E-cigarette/Vaping     E-cigarette/Vaping Substances     E-cigarette/Vaping Devices       ,   Medications Prior to Admission   Medication Sig Dispense Refill Last Dose   • ibuprofen (ADVIL,MOTRIN) 200 MG tablet Take 600  mg by mouth Every 6 (Six) Hours As Needed for Mild Pain .      • ibuprofen (ADVIL,MOTRIN) 600 MG tablet 600 mg Daily As Needed.      , Scheduled Meds:  acetaminophen, 1,000 mg, Oral, TID  sodium chloride, 10 mL, Intravenous, Q12H  traMADol, 75 mg, Oral, TID    , Continuous Infusions:   , PRN Meds:  acetaminophen  •  aluminum-magnesium hydroxide-simethicone  •  Morphine  •  ondansetron **OR** ondansetron  •  oxyCODONE  •  [COMPLETED] Insert peripheral IV **AND** sodium chloride  •  sodium chloride  •  trolamine salicylate and Allergies:  Patient has no known allergies.   SMOKING STATUS: Non-smoker  Objective     Vital Signs   Temp:  [97.6 °F (36.4 °C)-97.9 °F (36.6 °C)] 97.9 °F (36.6 °C)  Heart Rate:  [] 88  Resp:  [18] 18  BP: ()/(60-95) 125/71  Body mass index is 29.18 kg/m².    Physical Exam:     Body mass index is 29.18 kg/m².    GENERAL:           The patient is in no acute distress, and is able to answer all questions appropriately.    Neck:          Supple without lymphadenopathy    Cardiovascular:       Peripheral pulses 2+ at dorsalis pedis and posterior tibialis    Lungs:         Breathing unlabored    Musculoskeletal:            strength is 5 out of 5 bilaterally.        Shoulder abduction is 5 out of 5.         Dorsiflexion is 5/5 Bilaterally       Plantarflexion is 5/5 bilaterally      Neurologic:          The patient is alert and oriented by 3.          Pupils are equal and reactive to light.         Visual fields are full.         Extraocular movements are intact without nystagmus.         There is no evidence of central motor drift. No facial droop.  No difficulty with rapid alternating movements.         Sensation is equal bilaterally with no deficit.              CRANIAL NERVES:         Cranial nerve II: Visual fields are full to confrontation.       Cranial nerves III, IV and VI: PERRLA DC.  Extraocular movements are intact.  Nystagmus is not present.       Cranial nerve V: Facial  sensation is intact to light touch.       Cranial nerve VII: Muscles of facial expression revealed no asymmetry.       Cranial nerve VIII: Hearing is intact to finger rub bilaterally.       Cranial nerve IX and X: Palate elevates symmetrically.        Cranial nerve XI: Shoulder shrug is intact.       Cranial nerve XII: Tongue is midline without evidence of Atrophy or fasciculation.      Results Review:   I reviewed the patient's new imaging results and agree with the interpretation.  Mild frontal subdural measuring max width of 3-4 mm.    Assessment & Plan       SDH (subdural hematoma) (HCC)    Hematoma of gluteal muscle    Fall from horse    Musculoskeletal chest pain    From a neurosurgical perspective I think it is reasonable to have her follow-up just with a telephone visit to make sure that her back is not still giving her issues.  Patient had a scheduled CT scan which she would like to withhold from secondary to being asymptomatic which I think is absolutely reasonable.    Patient also wishing to go home and I have given her signs and symptoms to look out for that would necessitate a referral back to us or more urgent fashion I am giving her direction on how to get ahold of us.    From neurosurgical perspective we will set her up with a 2-3-week telephone visit to check in    I discussed the patients findings and my recommendations with patient and consulting provider    Raciel Barry PA-C  08/15/22  09:33 EDT    Time: 60 minutes

## 2022-08-15 NOTE — DISCHARGE SUMMARY
[facility] Medicine Services  DISCHARGE SUMMARY    Patient Name: Alyssia Moody  : 1960  MRN: [id number]    Date of Admission: 2022  7:29 PM  Date of Discharge:  08/15/22  Primary Care Physician: Hailee Doss MD    Consults     Date and Time Order Name Status Description    8/15/2022  7:01 AM Inpatient Neurosurgery Consult Completed           Hospital Course     Presenting Problem:   SDH (subdural hematoma) (HCC) [S06.5X9A]    Active Hospital Problems    Diagnosis  POA   • **SDH (subdural hematoma) (HCC) [S06.5X9A]  Yes     Priority: Medium   • Acute blood loss anemia [D62]  Yes   • Hematoma of gluteal muscle [T14.8XXA]  Yes   • Fall from horse [V80.010A]  Not Applicable   • Musculoskeletal chest pain [R07.89]  Yes      Resolved Hospital Problems   No resolved problems to display.          Hospital Course:  Alyssia Moody is a 62 y.o. female who is a generally very healthy who presented to the emergency room after having a fall from a horse.  She did hit her head but no loss of consciousness.  Also had some significant right buttock bruising and pain.  Trauma work-up in the emergency room was significant for a 4 mm subdural hematoma but with no neurological deficits.  There is also a right gluteal hematoma.  She had a presyncopal spell in the emergency room which was likely a vasovagal response.  She was admitted overnight for observation.  Seen by neurosurgery this morning who feel that given her stable neurological exam there is no need to repeat the head CT.  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  She knows how to get ahold of them if any follow-up is needed and to return to the emergency room with any new developing neurologic symptoms.  She likely has a mild acute blood loss anemia related to her gluteal hematoma with her hemoglobin going from 13 to 10.5.  She is able to ambulate on her own.  She feels okay to  discharge home.  Pain medications were offered but she declined and prefers to use over-the-counter pain meds which is reasonable.  I would like her to see her primary care physician in 1 week to make sure she is still doing okay.      Discharge Follow Up Recommendations:  Follow-up with primary care physician 1 week  Follow-up with neurosurgery as needed    Day of Discharge     HPI:   Main complaint is generalized soreness.  Is able to ambulate on her own.  No developing neurologic symptoms overnight.    Review of Systems  Gen- No fevers, chills  CV- No chest pain, palpitations  Resp- No cough, dyspnea  GI- No N/V/D, abd pain    Vital Signs:   Temp:  [97.6 °F (36.4 °C)-97.9 °F (36.6 °C)] 97.9 °F (36.6 °C)  Heart Rate:  [] 88  Resp:  [18] 18  BP: ()/(60-95) 125/71      Physical Exam:  Constitutional: No acute distress, awake, alert, sitting up in chair  HENT: NCAT, mucous membranes moist  Respiratory: Clear to auscultation bilaterally, respiratory effort normal on room air  Cardiovascular: RRR, no murmurs, rubs, or gallops  Gastrointestinal: Positive bowel sounds, soft, nontender, nondistended  Musculoskeletal: No bilateral ankle edema, some scattered ecchymoses and bruising  Psychiatric: Appropriate affect, cooperative  Neurologic: Oriented x 3, no focal deficits, speech clear, thought content intact  Skin: No rashes      Pertinent  and/or Most Recent Results     LAB RESULTS:      Lab 08/15/22  0417 08/14/22  2311 08/14/22 2009   WBC  --   --  16.63*   HEMOGLOBIN 10.3* 11.6* 13.1   HEMATOCRIT 32.4* 34.8 40.0   PLATELETS  --   --  235   NEUTROS ABS  --   --  14.25*   IMMATURE GRANS (ABS)  --   --  0.13*   LYMPHS ABS  --   --  1.41   MONOS ABS  --   --  0.79   EOS ABS  --   --  0.01   MCV  --   --  81.1         Lab 08/14/22 2009   SODIUM 138   POTASSIUM 4.0   CHLORIDE 105   CO2 20.0*   ANION GAP 13.0   BUN 23   CREATININE 1.00   EGFR 64.2   GLUCOSE 119*   CALCIUM 9.3         Lab 08/14/22 2009   TOTAL  PROTEIN 7.9   ALBUMIN 4.60   GLOBULIN 3.3   ALT (SGPT) 19   AST (SGOT) 24   BILIRUBIN 0.5   ALK PHOS 58         Lab 08/14/22 2009   TROPONIN T <0.010                 Brief Urine Lab Results  (Last result in the past 365 days)      Color   Clarity   Blood   Leuk Est   Nitrite   Protein   CREAT   Urine HCG        12/23/21 0852 Yellow   Clear   Trace   Negative   Negative   Negative               Microbiology Results (last 10 days)     ** No results found for the last 240 hours. **          CT Head Without Contrast    Result Date: 8/14/2022  EXAMINATION: CT HEAD WO CONTRAST, CT CERVICAL SPINE WO CONTRAST, CT FACIAL BONES WO CONTRAST DATE: 8/14/2022 9:04 PM  INDICATION: Head trauma, thrown off horse  COMPARISON: None available. TECHNIQUE: Thin section noncontrast axial images were obtained through the head. Coronal reformatted images were created. CT dose lowering techniques were used, to include: automated exposure control, adjustment for patient size, and or use of iterative reconstruction FINDINGS: Bones and extracranial soft tissues: Calvarium is intact. Paranasal sinuses and mastoid air cells are essentially clear. Intracranial contents: Thin subdural hematoma along the posterior falx measuring 4 mm in thickness. Gray-white differentiation is preserved. There is age-appropriate whole brain atrophy.  Basal cisterns are patent. No hemorrhage, extra-axial collection, or hydrocephalus. No CT evidence of acute ischemia. No mass or mass effect. TECHNIQUE: Thin section axial noncontrast images were obtained through the facial bones.  Sagittal and coronal reformatted images were created.  Images were reviewed in bone and soft tissue windows.  CT dose lowering techniques were used, to include: automated exposure control, adjustment for patient size, and or use of iterative reconstruction. FINDINGS: Bones: No evidence of acute facial fracture. Mandible is intact and the condyles located. Soft tissues: Facial soft tissues  are unremarkable. Globes and orbits are grossly normal. TECHNIQUE: Thin section axial noncontrast images were obtained through the cervical spine.  Sagittal and coronal reformatted images were created.  Images were reviewed in bone and soft tissue windows. CT dose lowering techniques were used, to include: automated exposure control, adjustment for patient size, and or use of iterative reconstruction. FINDINGS:  Vertebral column: Straightening of the normal cervical lordosis with mild degenerative subluxations. Alignment of the craniocervical junction is preserved. No acute fracture. Vertebral body heights are maintained. Normal bone mineralization. Multilevel cervical spondylosis worse at C5-6 where uncovertebral joint hypertrophy contributes to moderate left-sided neural foraminal stenosis. No significant bony spinal canal stenosis. Soft tissues: Lung apices are clear. Cervical soft tissues are unremarkable.     Head CT: 1.  Thin (4 mm) subdural hematoma along the posterior falx without significant mass effect. Facial CT: 1.  No acute facial fracture. Cervical spine CT: 1.  No acute fracture or malalignment in the cervical spine. Results were discussed with ELIE HUGO via phone at 8/14/2022 8:11 PM by Dr. Alves.  A read back of two patient identifiers and impression occurred. Electronically signed by:  Jeff Alves  8/14/2022 8:20 PM Mountain Time    CT Chest With Contrast Diagnostic    Result Date: 8/14/2022  CT CHEST, ABDOMEN AND PELVIS WITH CONTRAST CLINICAL INDICATION: Trauma. COMPARISON: None. TECHNIQUE: 95 cc Isovue-300 administered intravenously. CT images of the chest, abdomen and pelvis were obtained. CT dose lowering techniques were used, to include: automated exposure control, adjustment for patient size, and or use of iterative reconstruction. FINDINGS: Cardiovascular: Heart size normal without pericardial effusion. Mediastinal vessels normal in caliber and patency. Mediastinum: No mediastinal  or hilar lymphadenopathy. Trachea and central airways are patent. Thyroid is normal. Esophagus is normal. Lungs and pleura: Lungs are clear without focal consolidation, pleural effusion or pneumothorax. Liver and biliary system: The liver is normal in size and configuration without focal lesion. No intra or extrahepatic biliary ductal dilatation is noted. The gallbladder is normal without stones, wall thickening or adjacent fluid. Pancreas: The pancreas is unremarkable. Spleen: The spleen is normal. Adrenals: The adrenal glands are within normal limits. Kidneys: The kidneys are symmetric in size and enhancement and without focal lesion or hydronephrosis. Gastrointestinal: The stomach and scattered loops of small and large bowel have a nonobstructive pattern. Diverticulosis. Mesentery and retroperitoneum: No mesenteric or retroperitoneal adenopathy. No abnormal fluid collection, mass or free air. Pelvis: The urinary bladder is moderately distended with a smooth contour. Rectum is normal. No free fluid. No deep pelvic or inguinal adenopathy. Reproductive: No acute abnormality. Body wall: Subcutaneous contusion and hyperdense fluid are seen in the right gluteal region. Bones: No acute osseous abnormality. Thoracolumbar vertebral body height, alignment and disc spaces are maintained throughout.     Impression: 1. Right gluteal contusion and subcutaneous hematoma. 2. No other acute traumatic injury. 3. Diverticulosis without acute diverticulitis. Electronically signed by:  Giovani Inman M.D.  8/14/2022 7:54 PM Mountain Time    CT Cervical Spine Without Contrast    Result Date: 8/14/2022  EXAMINATION: CT HEAD WO CONTRAST, CT CERVICAL SPINE WO CONTRAST, CT FACIAL BONES WO CONTRAST DATE: 8/14/2022 9:04 PM  INDICATION: Head trauma, thrown off horse  COMPARISON: None available. TECHNIQUE: Thin section noncontrast axial images were obtained through the head. Coronal reformatted images were created. CT dose lowering techniques  were used, to include: automated exposure control, adjustment for patient size, and or use of iterative reconstruction FINDINGS: Bones and extracranial soft tissues: Calvarium is intact. Paranasal sinuses and mastoid air cells are essentially clear. Intracranial contents: Thin subdural hematoma along the posterior falx measuring 4 mm in thickness. Gray-white differentiation is preserved. There is age-appropriate whole brain atrophy.  Basal cisterns are patent. No hemorrhage, extra-axial collection, or hydrocephalus. No CT evidence of acute ischemia. No mass or mass effect. TECHNIQUE: Thin section axial noncontrast images were obtained through the facial bones.  Sagittal and coronal reformatted images were created.  Images were reviewed in bone and soft tissue windows.  CT dose lowering techniques were used, to include: automated exposure control, adjustment for patient size, and or use of iterative reconstruction. FINDINGS: Bones: No evidence of acute facial fracture. Mandible is intact and the condyles located. Soft tissues: Facial soft tissues are unremarkable. Globes and orbits are grossly normal. TECHNIQUE: Thin section axial noncontrast images were obtained through the cervical spine.  Sagittal and coronal reformatted images were created.  Images were reviewed in bone and soft tissue windows. CT dose lowering techniques were used, to include: automated exposure control, adjustment for patient size, and or use of iterative reconstruction. FINDINGS:  Vertebral column: Straightening of the normal cervical lordosis with mild degenerative subluxations. Alignment of the craniocervical junction is preserved. No acute fracture. Vertebral body heights are maintained. Normal bone mineralization. Multilevel cervical spondylosis worse at C5-6 where uncovertebral joint hypertrophy contributes to moderate left-sided neural foraminal stenosis. No significant bony spinal canal stenosis. Soft tissues: Lung apices are clear.  Cervical soft tissues are unremarkable.     Head CT: 1.  Thin (4 mm) subdural hematoma along the posterior falx without significant mass effect. Facial CT: 1.  No acute facial fracture. Cervical spine CT: 1.  No acute fracture or malalignment in the cervical spine. Results were discussed with ELIE HUGO via phone at 8/14/2022 8:11 PM by Dr. Alves.  A read back of two patient identifiers and impression occurred. Electronically signed by:  Jeff Alves  8/14/2022 8:20 PM Mountain Time    CT Thoracic Spine Without Contrast    Result Date: 8/14/2022  EXAMINATION: CT THORACIC SPINE WO CONTRAST  8/14/2022 9:04 PM INDICATIONS: Back pain, thrown from horse COMPARISON: None TECHNIQUE: Axial noncontrast imaging throughout the thoracic spine followed by coronal and sagittal reformats. CT dose lowering techniques were used, to include: automated exposure control, adjustment for patient size, and/or use of iterative reconstruction. FINDINGS: Vertebral body height and alignment is preserved. Normal thoracic kyphosis is maintained. Mild generalized osteopenia. Minimal degenerative changes. No high-grade canal stenosis.     No acute fracture or traumatic subluxation in the thoracic spine. Electronically signed by:  Maynor Hernandez M.D.  8/14/2022 8:02 PM Mountain Time    CT Lumbar Spine Without Contrast    Result Date: 8/14/2022  EXAMINATION: CT of the lumbar spine without contrast DATE: 8/14/2022 9:04 PM INDICATION: Low back pain, thrown from horse COMPARISON: None TECHNIQUE: Thin section axial noncontrast images were obtained through the lumbar spine.  Sagittal and coronal reformatted images were created.  Images were reviewed in bone and soft tissue windows.  CT dose lowering techniques were used, to include: automated exposure control, adjustment for patient size, and or use of iterative reconstruction.? FINDINGS: 5 nonrib-bearing lumbar type vertebral bodies. Normal vertebral body height. Normal alignment. Mild  osteopenia. Normal lumbar lordosis is maintained. No acute fracture or traumatic subluxation. DEGENERATIVE CHANGES: Mild degenerative disc disease and facet arthropathy. No high-grade canal stenosis. VISUALIZED INTRA-ABDOMINAL COMPARTMENT: No acute abnormality.     No acute fracture or traumatic subluxation in the lumbar spine. Electronically signed by:  Maynor Hernandez M.D.  8/14/2022 8:08 PM Mountain Time    CT Abdomen Pelvis With Contrast    Result Date: 8/14/2022  CT CHEST, ABDOMEN AND PELVIS WITH CONTRAST CLINICAL INDICATION: Trauma. COMPARISON: None. TECHNIQUE: 95 cc Isovue-300 administered intravenously. CT images of the chest, abdomen and pelvis were obtained. CT dose lowering techniques were used, to include: automated exposure control, adjustment for patient size, and or use of iterative reconstruction. FINDINGS: Cardiovascular: Heart size normal without pericardial effusion. Mediastinal vessels normal in caliber and patency. Mediastinum: No mediastinal or hilar lymphadenopathy. Trachea and central airways are patent. Thyroid is normal. Esophagus is normal. Lungs and pleura: Lungs are clear without focal consolidation, pleural effusion or pneumothorax. Liver and biliary system: The liver is normal in size and configuration without focal lesion. No intra or extrahepatic biliary ductal dilatation is noted. The gallbladder is normal without stones, wall thickening or adjacent fluid. Pancreas: The pancreas is unremarkable. Spleen: The spleen is normal. Adrenals: The adrenal glands are within normal limits. Kidneys: The kidneys are symmetric in size and enhancement and without focal lesion or hydronephrosis. Gastrointestinal: The stomach and scattered loops of small and large bowel have a nonobstructive pattern. Diverticulosis. Mesentery and retroperitoneum: No mesenteric or retroperitoneal adenopathy. No abnormal fluid collection, mass or free air. Pelvis: The urinary bladder is moderately distended with a  smooth contour. Rectum is normal. No free fluid. No deep pelvic or inguinal adenopathy. Reproductive: No acute abnormality. Body wall: Subcutaneous contusion and hyperdense fluid are seen in the right gluteal region. Bones: No acute osseous abnormality. Thoracolumbar vertebral body height, alignment and disc spaces are maintained throughout.     Impression: 1. Right gluteal contusion and subcutaneous hematoma. 2. No other acute traumatic injury. 3. Diverticulosis without acute diverticulitis. Electronically signed by:  Giovani Inman M.D.  8/14/2022 7:54 PM Mountain Time    CT Facial Bones Without Contrast    Result Date: 8/14/2022  EXAMINATION: CT HEAD WO CONTRAST, CT CERVICAL SPINE WO CONTRAST, CT FACIAL BONES WO CONTRAST DATE: 8/14/2022 9:04 PM  INDICATION: Head trauma, thrown off horse  COMPARISON: None available. TECHNIQUE: Thin section noncontrast axial images were obtained through the head. Coronal reformatted images were created. CT dose lowering techniques were used, to include: automated exposure control, adjustment for patient size, and or use of iterative reconstruction FINDINGS: Bones and extracranial soft tissues: Calvarium is intact. Paranasal sinuses and mastoid air cells are essentially clear. Intracranial contents: Thin subdural hematoma along the posterior falx measuring 4 mm in thickness. Gray-white differentiation is preserved. There is age-appropriate whole brain atrophy.  Basal cisterns are patent. No hemorrhage, extra-axial collection, or hydrocephalus. No CT evidence of acute ischemia. No mass or mass effect. TECHNIQUE: Thin section axial noncontrast images were obtained through the facial bones.  Sagittal and coronal reformatted images were created.  Images were reviewed in bone and soft tissue windows.  CT dose lowering techniques were used, to include: automated exposure control, adjustment for patient size, and or use of iterative reconstruction. FINDINGS: Bones: No evidence of acute  facial fracture. Mandible is intact and the condyles located. Soft tissues: Facial soft tissues are unremarkable. Globes and orbits are grossly normal. TECHNIQUE: Thin section axial noncontrast images were obtained through the cervical spine.  Sagittal and coronal reformatted images were created.  Images were reviewed in bone and soft tissue windows. CT dose lowering techniques were used, to include: automated exposure control, adjustment for patient size, and or use of iterative reconstruction. FINDINGS:  Vertebral column: Straightening of the normal cervical lordosis with mild degenerative subluxations. Alignment of the craniocervical junction is preserved. No acute fracture. Vertebral body heights are maintained. Normal bone mineralization. Multilevel cervical spondylosis worse at C5-6 where uncovertebral joint hypertrophy contributes to moderate left-sided neural foraminal stenosis. No significant bony spinal canal stenosis. Soft tissues: Lung apices are clear. Cervical soft tissues are unremarkable.     Head CT: 1.  Thin (4 mm) subdural hematoma along the posterior falx without significant mass effect. Facial CT: 1.  No acute facial fracture. Cervical spine CT: 1.  No acute fracture or malalignment in the cervical spine. Results were discussed with ELIE HUGO via phone at 8/14/2022 8:11 PM by Dr. Alves.  A read back of two patient identifiers and impression occurred. Electronically signed by:  Jeff Alves  8/14/2022 8:20 PM Mountain Time        Discharge Details        Discharge Medications      Changes to Medications      Instructions Start Date   ibuprofen 200 MG tablet  Commonly known as: ADVIL,MOTRIN  What changed: Another medication with the same name was removed. Continue taking this medication, and follow the directions you see here.   600 mg, Oral, Every 6 Hours PRN             No Known Allergies      Discharge Disposition:  Home or Self Care    Diet:  Hospital:  Diet Order   Procedures   •  Diet Regular       Activity:  Activity Instructions     Activity as Tolerated               CODE STATUS:    Code Status and Medical Interventions:   Ordered at: 08/14/22 2331     Code Status (Patient has no pulse and is not breathing):    CPR (Attempt to Resuscitate)     Medical Interventions (Patient has pulse or is breathing):    Full Support       Future Appointments   Date Time Provider Department Center   1/5/2023  8:15 AM Hailee Doss MD [facility]       Additional Instructions for the Follow-ups that You Need to Schedule     Discharge Follow-up with PCP   As directed       Currently Documented PCP:    Hailee Doss MD    PCP Phone Number:    [phone]     Follow Up Details: PCP 1 week                 Froylan Murphy MD  08/15/22      Time Spent on Discharge:  I spent  25  minutes on this discharge activity which included: face-to-face encounter with the patient, reviewing the data in the system, coordination of the care with the nursing staff as well as consultants, documentation, and entering orders.

## 2022-08-15 NOTE — PLAN OF CARE
Goal Outcome Evaluation:         Pt arrived to 3G/H from the Ed status post fall from a horse. Pt R buttock area bruised and swollen. Pt vital signs have remained stable. Blood pressure within regular parameters. No blurred vision, dizziness, nausea. No syncopal episodes. Pt complains of aching discomfort. Bolus given to pt as directed. Saturating mid 90's on room air. Heart rate stable. Will continue to monitor patient for remainder of shift.    Thank you,     LS

## 2022-08-16 ENCOUNTER — TRANSITIONAL CARE MANAGEMENT TELEPHONE ENCOUNTER (OUTPATIENT)
Dept: CALL CENTER | Facility: HOSPITAL | Age: 62
End: 2022-08-16

## 2022-08-16 LAB
QT INTERVAL: 342 MS
QTC INTERVAL: 429 MS

## 2022-08-16 NOTE — OUTREACH NOTE
Call Center TCM Note    Flowsheet Row Responses   [facility] patient discharged from? [place]   Does the patient have one of the following disease processes/diagnoses(primary or secondary)? Other   TCM attempt successful? Yes   Call start time [id number]   Call end time 0903   Discharge diagnosis Subdural hematoma   Person spoke with today (if not patient) and relationship Patient   Meds reviewed with patient/caregiver? Yes   Is the patient having any side effects they believe may be caused by any medication additions or changes? No   Prescription comments Patient did not want any pain medications at discharge states she is still doing well with OTC medications for pain.   Does the patient have a primary care provider?  Yes   Does the patient have an appointment with their PCP within 7 days of discharge? Yes   Comments regarding PCP Scheduled hospital follow up for patient- 08/17 @ 1p   Has the patient kept scheduled appointments due by today? Yes   Has home health visited the patient within 72 hours of discharge? N/A   Did the patient receive a copy of their discharge instructions? Yes   Nursing interventions Reviewed instructions with patient   What is the patient's perception of their health status since discharge? Improving   Is the patient/caregiver able to teach back signs and symptoms related to disease process for when to call PCP? Yes   Is the patient/caregiver able to teach back signs and symptoms related to disease process for when to call 911? Yes   Is the patient/caregiver able to teach back the hierarchy of who to call/visit for symptoms/problems? PCP, Specialist, Home health nurse, Urgent Care, ED, 911 Yes   TCM call completed? Yes          Shira Clemens RN    8/16/2022, 09:05 EDT

## 2022-08-17 ENCOUNTER — OFFICE VISIT (OUTPATIENT)
Dept: INTERNAL MEDICINE | Facility: CLINIC | Age: 62
End: 2022-08-17

## 2022-08-17 VITALS
HEIGHT: 64 IN | TEMPERATURE: 97.5 F | RESPIRATION RATE: 18 BRPM | BODY MASS INDEX: 29.19 KG/M2 | SYSTOLIC BLOOD PRESSURE: 140 MMHG | OXYGEN SATURATION: 97 % | HEART RATE: 85 BPM | DIASTOLIC BLOOD PRESSURE: 64 MMHG | WEIGHT: 171 LBS

## 2022-08-17 DIAGNOSIS — D62 ACUTE BLOOD LOSS ANEMIA: ICD-10-CM
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
## 2022-08-17 DIAGNOSIS — S06.5XAA SDH (SUBDURAL HEMATOMA): ICD-10-CM

## 2022-08-17 DIAGNOSIS — T14.8XXA HEMATOMA OF MUSCLE: Primary | ICD-10-CM

## 2022-08-17 DIAGNOSIS — R07.89 MUSCULOSKELETAL CHEST PAIN: ICD-10-CM

## 2022-08-17 PROCEDURE — 99495 TRANSJ CARE MGMT MOD F2F 14D: CPT | Performed by: PHYSICIAN ASSISTANT

## 2022-08-17 NOTE — PROGRESS NOTES
Chief Complaint  Follow-up (Fell off horse, ER visit on 8/14/22)    Subjective        Alyssia Moody presents to [facility]  History of Present Illness  Patient was seen in ED after falling off horse on 8-14-22 and admitted secondary to anemia due to large eccymosis of buttock. Her hospital course was as follows from chart-      Hospital Course:  Alyssia Moody is a 62 y.o. female who is a generally very healthy who presented to the emergency room after having a fall from a horse.  She did hit her head but no loss of consciousness.  Also had some significant right buttock bruising and pain.  Trauma work-up in the emergency room was significant for a 4 mm subdural hematoma but with no neurological deficits.  There is also a right gluteal hematoma.  She had a presyncopal spell in the emergency room which was likely a vasovagal response.  She was admitted overnight for observation.  Seen by neurosurgery this morning who feel that given her stable neurological exam there is no need to repeat the head CT.  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  She knows how to get ahold of them if any follow-up is needed and to return to the emergency room with any new developing neurologic symptoms.  She likely has a mild acute blood loss anemia related to her gluteal hematoma with her hemoglobin going from 13 to 10.5.  She is able to ambulate on her own.  She feels okay to discharge home.  Pain medications were offered but she declined and prefers to use over-the-counter pain meds which is reasonable.  I would like her to see her primary care physician in 1 week to make sure she is still doing okay.      Patient states that she is still sore overall.  She states that there are certain parts that are gotten a lot better and still parts of her that are still sore.  She continues to take ibuprofen daily.  She states that she is able to sleep at night.  She  "still has some pain if she moves just the right way.  She states that she has more pain on the right near her SI joint.  She states that she feels a bit fatigued and occasionally short of air but notes that she also has bruising to her upper left chest wall.  She had no further vasovagal symptoms.      Objective   Vital Signs:  /64 (Cuff Size: Adult)   Pulse 85   Temp 97.5 °F (36.4 °C) (Infrared)   Resp 18   Ht 162.6 cm (64\")   Wt 77.6 kg (171 lb)   SpO2 97%   BMI 29.35 kg/m²   Estimated body mass index is 29.35 kg/m² as calculated from the following:    Height as of this encounter: 162.6 cm (64\").    Weight as of this encounter: 77.6 kg (171 lb).          Physical Exam  Vitals reviewed.   Constitutional:       Appearance: Normal appearance. She is normal weight.   HENT:      Head: Normocephalic and atraumatic.      Right Ear: Tympanic membrane, ear canal and external ear normal.      Left Ear: Tympanic membrane, ear canal and external ear normal.      Nose: Nose normal.      Mouth/Throat:      Mouth: Mucous membranes are moist.   Eyes:      Pupils: Pupils are equal, round, and reactive to light.   Cardiovascular:      Rate and Rhythm: Normal rate and regular rhythm.      Heart sounds: Normal heart sounds.   Pulmonary:      Effort: Pulmonary effort is normal.      Breath sounds: Normal breath sounds.   Skin:     Comments: Bruising under her chin and along neck.  She also had bruising along her left arm.   Neurological:      General: No focal deficit present.      Mental Status: She is alert.   Psychiatric:         Mood and Affect: Mood normal.        Result Review :       Hemoglobin & Hematocrit, Blood (08/15/2022 04:17)  COVID PRE-OP / PRE-PROCEDURE SCREENING ORDER (NO ISOLATION) - Swab, Nasopharynx (08/15/2022 04:10)  Hemoglobin & Hematocrit, Blood (08/14/2022 23:11)  Troponin (08/14/2022 20:09)  Comprehensive Metabolic Panel (08/14/2022 20:09)  CBC & Differential (08/14/2022 20:09)          "   Assessment and Plan   Diagnoses and all orders for this visit:    1. Hematoma of gluteal muscle (Primary)  Patient reports improvement we will continue to monitor  2. Musculoskeletal chest pain  Patient reports improvement we will continue her ibuprofen daily.  Patient declines other pain medications at this time.  3. Fall from horse, sequela  Overall improvement  4. Acute blood loss anemia  Patient defers globin checked today and will recheck in a week or so if she still has fatigue.  5. SDH (subdural hematoma) (HCC)  Resolved           Follow Up   No follow-ups on file.  Patient was given instructions and counseling regarding her condition or for health maintenance advice. Please see specific information pulled into the AVS if appropriate.

## 2023-01-01 NOTE — PROGRESS NOTES
Here for physical    Exercise: walks in intervals, rides horses, yoga as well  Diet: healthy overall though still drinks diet coke (2);  no vitamins.lots of dairy in diet     Fall from horse in 8/22- she was admitted overnight as she had a large R gluteal hematoma with associated anemia. She did not require a transfusion. Still has some swelling in her R buttock. Still a little pain when she does do certain activities    OA-  She uses ibuprofen 400 bid as needed, a few times a week for her knees    Vitamin D deficiecny- her D was 13 in 6/21. She takes vit D occasionally- is not good at taking meds daily    Skin lesion on R cheek - she did see derm and had biopsy done which was negative for cancer. She would like to have place removed    Current Outpatient Medications:   •  ibuprofen (ADVIL,MOTRIN) 200 MG tablet, Take 800 mg by mouth 2 (Two) Times a Day., Disp: , Rfl:     The following portions of the patient's history were reviewed and updated as appropriate: allergies, current medications, past family history, past medical history, past social history, past surgical history and problem list.    Review of Systems   Constitutional: Negative for activity change, appetite change, fever, unexpected weight gain and unexpected weight loss.   HENT: Negative.    Eyes: Negative.    Respiratory: Negative for shortness of breath and wheezing.    Cardiovascular: Negative for chest pain, palpitations and leg swelling.   Gastrointestinal: Negative.    Endocrine: Negative.    Genitourinary: Negative for difficulty urinating and dysuria.   Musculoskeletal: Positive for arthralgias and back pain (right lower back/buttock since fall).   Skin: Negative.    Allergic/Immunologic: Negative for immunocompromised state.   Neurological: Negative for seizures, speech difficulty, memory problem and confusion.   Hematological: Does not bruise/bleed easily.   Psychiatric/Behavioral: Negative for agitation.         Objective    /68 (BP  Location: Right arm, Patient Position: Sitting)   Pulse 63   Temp 96.9 °F (36.1 °C) (Infrared)   Ht 161.5 cm (63.6\")   Wt 77.1 kg (170 lb)   SpO2 97%   BMI 29.55 kg/m²   Physical Exam   Physical Exam  Vitals and nursing note reviewed.   Constitutional:       General: She is not in acute distress.     Appearance: She is well-developed. She is not diaphoretic.   HENT:      Head: Normocephalic and atraumatic.      Right Ear: External ear normal.      Left Ear: External ear normal.      Nose: Nose normal.      Mouth/Throat:      Pharynx: No oropharyngeal exudate.   Eyes:      General: No scleral icterus.        Right eye: No discharge.         Left eye: No discharge.      Conjunctiva/sclera: Conjunctivae normal.      Pupils: Pupils are equal, round, and reactive to light.   Neck:      Thyroid: No thyromegaly.   Cardiovascular:      Rate and Rhythm: Normal rate and regular rhythm.      Heart sounds: Normal heart sounds. No murmur heard.    No friction rub. No gallop.   Pulmonary:      Effort: Pulmonary effort is normal. No respiratory distress.      Breath sounds: Normal breath sounds. No wheezing or rales.   Chest:   Breasts:     Right: No mass, nipple discharge, skin change or tenderness.      Left: No mass, nipple discharge, skin change or tenderness.   Abdominal:      General: Bowel sounds are normal. There is no distension.      Palpations: Abdomen is soft. There is no mass.      Tenderness: There is no abdominal tenderness. There is no guarding or rebound.   Musculoskeletal:         General: No deformity. Normal range of motion.      Cervical back: Normal range of motion and neck supple.   Lymphadenopathy:      Cervical: No cervical adenopathy.   Skin:     General: Skin is warm and dry.      Coloration: Skin is not pale.      Findings: No erythema or rash.   Neurological:      Mental Status: She is alert and oriented to person, place, and time.      Coordination: Coordination normal.      Deep Tendon  Reflexes: Reflexes normal.   Psychiatric:         Behavior: Behavior normal.         Thought Content: Thought content normal.         Judgment: Judgment normal.           Assessment/Plan   Diagnoses and all orders for this visit:    1. Routine general medical examination at a health care facility (Primary)  -     CBC (No Diff); Future  -     TSH Rfx On Abnormal To Free T4; Future  -     Lipid Panel; Future  -     Comprehensive Metabolic Panel; Future  -     Hepatitis C Antibody; Future  -     Cancel: POC Urinalysis Dipstick, Automated    2. Vitamin D deficiency  -     Vitamin D,25-Hydroxy; Future    3. Need for hepatitis C screening test  -     Hepatitis C Antibody; Future    4. Need for COVID-19 vaccine  -     COVID-19 Bivalent Booster (Pfizer) 12+yrs    5. Encounter for screening for malignant neoplasm of breast, unspecified screening modality  -     Mammo Screening Digital Tomosynthesis Bilateral With CAD; Future        Regular exercise/healthy diet. BSE q month. Sunscreen use encouraged. Check fasting labs  Soham due now- we will order  Colon - she had cologuard in '21, repeat in '24  DT -she has had in the last 10 yrs  DEXA due 4/24 (osteopenia)  Shingles - shingrix discussed -  can check at pharmacy  covid bivalent - today  Flu-she had

## 2023-01-05 ENCOUNTER — LAB (OUTPATIENT)
Dept: INTERNAL MEDICINE | Facility: CLINIC | Age: 63
End: 2023-01-05
Payer: COMMERCIAL

## 2023-01-05 ENCOUNTER — OFFICE VISIT (OUTPATIENT)
Dept: INTERNAL MEDICINE | Facility: CLINIC | Age: 63
End: 2023-01-05
Payer: COMMERCIAL

## 2023-01-05 VITALS
WEIGHT: 170 LBS | BODY MASS INDEX: 29.02 KG/M2 | OXYGEN SATURATION: 97 % | HEIGHT: 64 IN | SYSTOLIC BLOOD PRESSURE: 122 MMHG | DIASTOLIC BLOOD PRESSURE: 68 MMHG | HEART RATE: 63 BPM | TEMPERATURE: 96.9 F

## 2023-01-05 DIAGNOSIS — Z23 NEED FOR COVID-19 VACCINE: ICD-10-CM

## 2023-01-05 DIAGNOSIS — Z00.00 ROUTINE GENERAL MEDICAL EXAMINATION AT A HEALTH CARE FACILITY: ICD-10-CM

## 2023-01-05 DIAGNOSIS — Z11.59 NEED FOR HEPATITIS C SCREENING TEST: ICD-10-CM

## 2023-01-05 DIAGNOSIS — E55.9 VITAMIN D DEFICIENCY: ICD-10-CM

## 2023-01-05 DIAGNOSIS — Z00.00 ROUTINE GENERAL MEDICAL EXAMINATION AT A HEALTH CARE FACILITY: Primary | ICD-10-CM

## 2023-01-05 DIAGNOSIS — Z12.39 ENCOUNTER FOR SCREENING FOR MALIGNANT NEOPLASM OF BREAST, UNSPECIFIED SCREENING MODALITY: ICD-10-CM

## 2023-01-05 LAB
25(OH)D3 SERPL-MCNC: 18.7 NG/ML (ref 30–100)
ALBUMIN SERPL-MCNC: 4.7 G/DL (ref 3.5–5.2)
ALBUMIN/GLOB SERPL: 1.3 G/DL
ALP SERPL-CCNC: 54 U/L (ref 39–117)
ALT SERPL W P-5'-P-CCNC: 19 U/L (ref 1–33)
ANION GAP SERPL CALCULATED.3IONS-SCNC: 10.4 MMOL/L (ref 5–15)
AST SERPL-CCNC: 22 U/L (ref 1–32)
BILIRUB SERPL-MCNC: 0.9 MG/DL (ref 0–1.2)
BUN SERPL-MCNC: 17 MG/DL (ref 8–23)
BUN/CREAT SERPL: 17.3 (ref 7–25)
CALCIUM SPEC-SCNC: 9.6 MG/DL (ref 8.6–10.5)
CHLORIDE SERPL-SCNC: 104 MMOL/L (ref 98–107)
CHOLEST SERPL-MCNC: 257 MG/DL (ref 0–200)
CO2 SERPL-SCNC: 26.6 MMOL/L (ref 22–29)
CREAT SERPL-MCNC: 0.98 MG/DL (ref 0.57–1)
DEPRECATED RDW RBC AUTO: 44.6 FL (ref 37–54)
EGFRCR SERPLBLD CKD-EPI 2021: 65.8 ML/MIN/1.73
ERYTHROCYTE [DISTWIDTH] IN BLOOD BY AUTOMATED COUNT: 15.6 % (ref 12.3–15.4)
GLOBULIN UR ELPH-MCNC: 3.5 GM/DL
GLUCOSE SERPL-MCNC: 105 MG/DL (ref 65–99)
HCT VFR BLD AUTO: 44.7 % (ref 34–46.6)
HCV AB SER DONR QL: NORMAL
HDLC SERPL-MCNC: 48 MG/DL (ref 40–60)
HGB BLD-MCNC: 14.4 G/DL (ref 12–15.9)
LDLC SERPL CALC-MCNC: 185 MG/DL (ref 0–100)
LDLC/HDLC SERPL: 3.81 {RATIO}
MCH RBC QN AUTO: 25.6 PG (ref 26.6–33)
MCHC RBC AUTO-ENTMCNC: 32.2 G/DL (ref 31.5–35.7)
MCV RBC AUTO: 79.5 FL (ref 79–97)
PLATELET # BLD AUTO: 227 10*3/MM3 (ref 140–450)
PMV BLD AUTO: 10.1 FL (ref 6–12)
POTASSIUM SERPL-SCNC: 4.8 MMOL/L (ref 3.5–5.2)
PROT SERPL-MCNC: 8.2 G/DL (ref 6–8.5)
RBC # BLD AUTO: 5.62 10*6/MM3 (ref 3.77–5.28)
SODIUM SERPL-SCNC: 141 MMOL/L (ref 136–145)
TRIGL SERPL-MCNC: 130 MG/DL (ref 0–150)
TSH SERPL DL<=0.05 MIU/L-ACNC: 2.53 UIU/ML (ref 0.27–4.2)
VLDLC SERPL-MCNC: 24 MG/DL (ref 5–40)
WBC NRBC COR # BLD: 5.71 10*3/MM3 (ref 3.4–10.8)

## 2023-01-05 PROCEDURE — 82306 VITAMIN D 25 HYDROXY: CPT | Performed by: INTERNAL MEDICINE

## 2023-01-05 PROCEDURE — 91312 COVID-19 (PFIZER) BIVALENT BOOSTER 12+YRS: CPT | Performed by: INTERNAL MEDICINE

## 2023-01-05 PROCEDURE — 86803 HEPATITIS C AB TEST: CPT | Performed by: INTERNAL MEDICINE

## 2023-01-05 PROCEDURE — 80050 GENERAL HEALTH PANEL: CPT | Performed by: INTERNAL MEDICINE

## 2023-01-05 PROCEDURE — 36415 COLL VENOUS BLD VENIPUNCTURE: CPT | Performed by: INTERNAL MEDICINE

## 2023-01-05 PROCEDURE — 99396 PREV VISIT EST AGE 40-64: CPT | Performed by: INTERNAL MEDICINE

## 2023-01-05 PROCEDURE — 0124A PR ADM SARSCOV2 30MCG/0.3ML BST: CPT | Performed by: INTERNAL MEDICINE

## 2023-01-05 PROCEDURE — 80061 LIPID PANEL: CPT | Performed by: INTERNAL MEDICINE

## 2023-01-08 ENCOUNTER — PATIENT MESSAGE (OUTPATIENT)
Dept: INTERNAL MEDICINE | Facility: CLINIC | Age: 63
End: 2023-01-08
Payer: COMMERCIAL

## 2023-01-08 DIAGNOSIS — E55.9 VITAMIN D DEFICIENCY: Primary | ICD-10-CM

## 2023-01-08 DIAGNOSIS — E78.00 PURE HYPERCHOLESTEROLEMIA: ICD-10-CM

## 2023-03-08 RX ORDER — ESCITALOPRAM OXALATE 10 MG/1
10 TABLET ORAL DAILY
Qty: 30 TABLET | Refills: 5 | Status: SHIPPED | OUTPATIENT
Start: 2023-03-08

## 2023-05-31 ENCOUNTER — HOSPITAL ENCOUNTER (OUTPATIENT)
Dept: MAMMOGRAPHY | Facility: HOSPITAL | Age: 63
Discharge: HOME | End: 2023-05-31
Admitting: INTERNAL MEDICINE

## 2023-05-31 DIAGNOSIS — Z12.39 ENCOUNTER FOR SCREENING FOR MALIGNANT NEOPLASM OF BREAST, UNSPECIFIED SCREENING MODALITY: ICD-10-CM

## 2023-05-31 PROCEDURE — 77067 SCR MAMMO BI INCL CAD: CPT

## 2023-05-31 PROCEDURE — 77063 BREAST TOMOSYNTHESIS BI: CPT

## 2023-08-18 RX ORDER — ESCITALOPRAM OXALATE 10 MG/1
10 TABLET ORAL DAILY
Qty: 30 TABLET | Refills: 5 | Status: SHIPPED | OUTPATIENT
Start: 2023-08-18

## 2023-08-18 NOTE — TELEPHONE ENCOUNTER
LV: 1/5/23  NV: 1/11/24  LF: 3/8/23 30/5  does she need a follow up appointment.   I could not tell from the last visit.

## 2024-02-20 RX ORDER — ESCITALOPRAM OXALATE 10 MG/1
10 TABLET ORAL DAILY
Qty: 30 TABLET | Refills: 0 | Status: SHIPPED | OUTPATIENT
Start: 2024-02-20

## 2024-02-20 NOTE — TELEPHONE ENCOUNTER
Patient has been notified she will need an appointment for her medication refill.    LV: 1/5/23  NV: not scheduled cx 1/11/24.  Patient staes she will make an appointment.  Can you send in a 30 day fill?

## 2024-03-21 RX ORDER — ESCITALOPRAM OXALATE 10 MG/1
10 TABLET ORAL DAILY
Qty: 7 TABLET | Refills: 0 | Status: SHIPPED | OUTPATIENT
Start: 2024-03-21

## 2024-03-25 RX ORDER — ESCITALOPRAM OXALATE 10 MG/1
10 TABLET ORAL DAILY
Qty: 90 TABLET | Refills: 0 | Status: SHIPPED | OUTPATIENT
Start: 2024-03-25 | End: 2025-03-25

## 2024-04-26 ENCOUNTER — HOSPITAL ENCOUNTER (EMERGENCY)
Facility: HOSPITAL | Age: 64
Discharge: HOME | End: 2024-04-26
Attending: EMERGENCY MEDICINE
Payer: COMMERCIAL

## 2024-04-26 ENCOUNTER — APPOINTMENT (OUTPATIENT)
Dept: CARDIOLOGY | Facility: HOSPITAL | Age: 64
End: 2024-04-26
Payer: COMMERCIAL

## 2024-04-26 ENCOUNTER — APPOINTMENT (OUTPATIENT)
Dept: GENERAL RADIOLOGY | Facility: HOSPITAL | Age: 64
End: 2024-04-26
Payer: COMMERCIAL

## 2024-04-26 VITALS
WEIGHT: 169.97 LBS | RESPIRATION RATE: 16 BRPM | DIASTOLIC BLOOD PRESSURE: 86 MMHG | TEMPERATURE: 98 F | SYSTOLIC BLOOD PRESSURE: 132 MMHG | OXYGEN SATURATION: 93 % | BODY MASS INDEX: 29.02 KG/M2 | HEART RATE: 64 BPM | HEIGHT: 64 IN

## 2024-04-26 DIAGNOSIS — R00.2 PALPITATIONS: ICD-10-CM

## 2024-04-26 DIAGNOSIS — I10 HYPERTENSION, UNSPECIFIED TYPE: ICD-10-CM

## 2024-04-26 DIAGNOSIS — R07.9 CHEST PAIN, UNSPECIFIED TYPE: ICD-10-CM

## 2024-04-26 DIAGNOSIS — I49.3 PVC'S (PREMATURE VENTRICULAR CONTRACTIONS): ICD-10-CM

## 2024-04-26 DIAGNOSIS — I49.3 FREQUENT PVCS: Primary | ICD-10-CM

## 2024-04-26 LAB
ALBUMIN SERPL-MCNC: 4 G/DL (ref 3.5–5.2)
ALBUMIN/GLOB SERPL: 1 G/DL
ALP SERPL-CCNC: 55 U/L (ref 39–117)
ALT SERPL W P-5'-P-CCNC: 19 U/L (ref 1–33)
ANION GAP SERPL CALCULATED.3IONS-SCNC: 13 MMOL/L (ref 5–15)
AST SERPL-CCNC: 20 U/L (ref 1–32)
BASOPHILS # BLD AUTO: 0.02 10*3/MM3 (ref 0–0.2)
BASOPHILS NFR BLD AUTO: 0.4 % (ref 0–1.5)
BH CV ECHO MEAS - AO MAX PG: 8.3 MMHG
BH CV ECHO MEAS - AO MEAN PG: 4 MMHG
BH CV ECHO MEAS - AO ROOT DIAM: 2.9 CM
BH CV ECHO MEAS - AO V2 MAX: 144 CM/SEC
BH CV ECHO MEAS - AO V2 VTI: 29.4 CM
BH CV ECHO MEAS - AVA(I,D): 2.18 CM2
BH CV ECHO MEAS - EDV(CUBED): 74.1 ML
BH CV ECHO MEAS - EDV(MOD-SP2): 95.9 ML
BH CV ECHO MEAS - EDV(MOD-SP4): 69.6 ML
BH CV ECHO MEAS - EF(MOD-BP): 60.5 %
BH CV ECHO MEAS - EF(MOD-SP2): 59.9 %
BH CV ECHO MEAS - EF(MOD-SP4): 60.1 %
BH CV ECHO MEAS - ESV(CUBED): 17.6 ML
BH CV ECHO MEAS - ESV(MOD-SP2): 38.5 ML
BH CV ECHO MEAS - ESV(MOD-SP4): 27.8 ML
BH CV ECHO MEAS - FS: 38.1 %
BH CV ECHO MEAS - IVS/LVPW: 1 CM
BH CV ECHO MEAS - IVSD: 0.9 CM
BH CV ECHO MEAS - LA DIMENSION: 3.9 CM
BH CV ECHO MEAS - LAT PEAK E' VEL: 13.8 CM/SEC
BH CV ECHO MEAS - LV DIASTOLIC VOL/BSA (35-75): 38.1 CM2
BH CV ECHO MEAS - LV MASS(C)D: 118.7 GRAMS
BH CV ECHO MEAS - LV MAX PG: 3.8 MMHG
BH CV ECHO MEAS - LV MEAN PG: 2 MMHG
BH CV ECHO MEAS - LV SYSTOLIC VOL/BSA (12-30): 15.2 CM2
BH CV ECHO MEAS - LV V1 MAX: 97.3 CM/SEC
BH CV ECHO MEAS - LV V1 VTI: 20.4 CM
BH CV ECHO MEAS - LVIDD: 4.2 CM
BH CV ECHO MEAS - LVIDS: 2.6 CM
BH CV ECHO MEAS - LVOT AREA: 3.1 CM2
BH CV ECHO MEAS - LVOT DIAM: 2 CM
BH CV ECHO MEAS - LVPWD: 0.9 CM
BH CV ECHO MEAS - MED PEAK E' VEL: 11.5 CM/SEC
BH CV ECHO MEAS - MV A MAX VEL: 87.4 CM/SEC
BH CV ECHO MEAS - MV DEC SLOPE: 483 CM/SEC2
BH CV ECHO MEAS - MV DEC TIME: 0.23 SEC
BH CV ECHO MEAS - MV E MAX VEL: 81.5 CM/SEC
BH CV ECHO MEAS - MV E/A: 0.93
BH CV ECHO MEAS - MV MAX PG: 4.5 MMHG
BH CV ECHO MEAS - MV MEAN PG: 2 MMHG
BH CV ECHO MEAS - MV P1/2T: 66.1 MSEC
BH CV ECHO MEAS - MV V2 VTI: 31.1 CM
BH CV ECHO MEAS - MVA(P1/2T): 3.3 CM2
BH CV ECHO MEAS - MVA(VTI): 2.06 CM2
BH CV ECHO MEAS - PA ACC TIME: 0.16 SEC
BH CV ECHO MEAS - RAP SYSTOLE: 3 MMHG
BH CV ECHO MEAS - RVSP: 28 MMHG
BH CV ECHO MEAS - SV(LVOT): 64.1 ML
BH CV ECHO MEAS - SV(MOD-SP2): 57.4 ML
BH CV ECHO MEAS - SV(MOD-SP4): 41.8 ML
BH CV ECHO MEAS - SVI(LVOT): 35.1 ML/M2
BH CV ECHO MEAS - SVI(MOD-SP2): 31.4 ML/M2
BH CV ECHO MEAS - SVI(MOD-SP4): 22.9 ML/M2
BH CV ECHO MEAS - TAPSE (>1.6): 2.8 CM
BH CV ECHO MEAS - TR MAX PG: 25 MMHG
BH CV ECHO MEAS - TR MAX VEL: 249.8 CM/SEC
BH CV ECHO MEASUREMENTS AVERAGE E/E' RATIO: 6.44
BH CV REST NUCLEAR ISOTOPE DOSE: 29.9 MCI
BH CV STRESS BP STAGE 2: NORMAL
BH CV STRESS BP STAGE 4: NORMAL
BH CV STRESS COMMENTS STAGE 1: NORMAL
BH CV STRESS DOSE REGADENOSON STAGE 1: 0.4
BH CV STRESS DURATION MIN STAGE 1: 1
BH CV STRESS DURATION MIN STAGE 2: 1
BH CV STRESS DURATION MIN STAGE 3: 1
BH CV STRESS DURATION MIN STAGE 4: 1
BH CV STRESS DURATION SEC STAGE 1: 0
BH CV STRESS DURATION SEC STAGE 2: 0
BH CV STRESS DURATION SEC STAGE 3: 0
BH CV STRESS DURATION SEC STAGE 4: 0
BH CV STRESS HR STAGE 1: 95
BH CV STRESS HR STAGE 2: 100
BH CV STRESS HR STAGE 3: 98
BH CV STRESS HR STAGE 4: 95
BH CV STRESS NUCLEAR ISOTOPE DOSE: 29.9 MCI
BH CV STRESS O2 STAGE 1: 96
BH CV STRESS O2 STAGE 2: 96
BH CV STRESS O2 STAGE 3: 96
BH CV STRESS O2 STAGE 4: 95
BH CV STRESS PROTOCOL 1: NORMAL
BH CV STRESS RECOVERY BP: NORMAL MMHG
BH CV STRESS RECOVERY HR: 88 BPM
BH CV STRESS RECOVERY O2: 95 %
BH CV STRESS STAGE 1: 1
BH CV STRESS STAGE 2: 2
BH CV STRESS STAGE 3: 3
BH CV STRESS STAGE 4: 4
BH CV VAS BP RIGHT ARM: NORMAL MMHG
BH CV XLRA - RV BASE: 4 CM
BH CV XLRA - RV LENGTH: 7.7 CM
BH CV XLRA - RV MID: 3 CM
BH CV XLRA - TDI S': 16.9 CM/SEC
BILIRUB SERPL-MCNC: 0.7 MG/DL (ref 0–1.2)
BUN SERPL-MCNC: 11 MG/DL (ref 8–23)
BUN/CREAT SERPL: 13.9 (ref 7–25)
CALCIUM SPEC-SCNC: 9 MG/DL (ref 8.6–10.5)
CHLORIDE SERPL-SCNC: 102 MMOL/L (ref 98–107)
CHOLEST SERPL-MCNC: 216 MG/DL (ref 0–200)
CO2 SERPL-SCNC: 24 MMOL/L (ref 22–29)
CREAT SERPL-MCNC: 0.79 MG/DL (ref 0.57–1)
D DIMER PPP FEU-MCNC: 0.37 MCGFEU/ML (ref 0–0.62)
DEPRECATED RDW RBC AUTO: 44.9 FL (ref 37–54)
EGFRCR SERPLBLD CKD-EPI 2021: 84.7 ML/MIN/1.73
EOSINOPHIL # BLD AUTO: 0.05 10*3/MM3 (ref 0–0.4)
EOSINOPHIL NFR BLD AUTO: 1 % (ref 0.3–6.2)
ERYTHROCYTE [DISTWIDTH] IN BLOOD BY AUTOMATED COUNT: 15 % (ref 12.3–15.4)
GEN 5 2HR TROPONIN T REFLEX: <6 NG/L
GLOBULIN UR ELPH-MCNC: 3.9 GM/DL
GLUCOSE SERPL-MCNC: 110 MG/DL (ref 65–99)
HCT VFR BLD AUTO: 43.1 % (ref 34–46.6)
HDLC SERPL-MCNC: 46 MG/DL (ref 40–60)
HGB BLD-MCNC: 13.6 G/DL (ref 12–15.9)
HOLD SPECIMEN: NORMAL
IMM GRANULOCYTES # BLD AUTO: 0.01 10*3/MM3 (ref 0–0.05)
IMM GRANULOCYTES NFR BLD AUTO: 0.2 % (ref 0–0.5)
LDLC SERPL CALC-MCNC: 151 MG/DL (ref 0–100)
LDLC/HDLC SERPL: 3.25 {RATIO}
LEFT ATRIUM VOLUME INDEX: 25.3 ML/M2
LIPASE SERPL-CCNC: 34 U/L (ref 13–60)
LV EF NUC BP: 68 %
LYMPHOCYTES # BLD AUTO: 1.41 10*3/MM3 (ref 0.7–3.1)
LYMPHOCYTES NFR BLD AUTO: 27.3 % (ref 19.6–45.3)
MAGNESIUM SERPL-MCNC: 2.2 MG/DL (ref 1.6–2.4)
MAXIMAL PREDICTED HEART RATE: 158 BPM
MCH RBC QN AUTO: 25.6 PG (ref 26.6–33)
MCHC RBC AUTO-ENTMCNC: 31.6 G/DL (ref 31.5–35.7)
MCV RBC AUTO: 81.2 FL (ref 79–97)
MONOCYTES # BLD AUTO: 0.34 10*3/MM3 (ref 0.1–0.9)
MONOCYTES NFR BLD AUTO: 6.6 % (ref 5–12)
NEUTROPHILS NFR BLD AUTO: 3.33 10*3/MM3 (ref 1.7–7)
NEUTROPHILS NFR BLD AUTO: 64.5 % (ref 42.7–76)
NRBC BLD AUTO-RTO: 0 /100 WBC (ref 0–0.2)
NT-PROBNP SERPL-MCNC: 150.7 PG/ML (ref 0–900)
PERCENT MAX PREDICTED HR: 67.09 %
PLATELET # BLD AUTO: 234 10*3/MM3 (ref 140–450)
PMV BLD AUTO: 9.5 FL (ref 6–12)
POTASSIUM SERPL-SCNC: 4.2 MMOL/L (ref 3.5–5.2)
PROT SERPL-MCNC: 7.9 G/DL (ref 6–8.5)
QT INTERVAL: 382 MS
QT INTERVAL: 410 MS
QTC INTERVAL: 410 MS
QTC INTERVAL: 443 MS
RBC # BLD AUTO: 5.31 10*6/MM3 (ref 3.77–5.28)
SODIUM SERPL-SCNC: 139 MMOL/L (ref 136–145)
STRESS BASELINE BP: NORMAL MMHG
STRESS BASELINE HR: 65 BPM
STRESS O2 SAT REST: 93 %
STRESS PERCENT HR: 79 %
STRESS POST ESTIMATED WORKLOAD: 1 METS
STRESS POST EXERCISE DUR MIN: 4 MIN
STRESS POST EXERCISE DUR SEC: 0 SEC
STRESS POST O2 SAT PEAK: 96 %
STRESS POST PEAK BP: NORMAL MMHG
STRESS POST PEAK HR: 106 BPM
STRESS TARGET HR: 134 BPM
TRIGL SERPL-MCNC: 103 MG/DL (ref 0–150)
TROPONIN T DELTA: NORMAL
TROPONIN T SERPL HS-MCNC: 8 NG/L
VLDLC SERPL-MCNC: 19 MG/DL (ref 5–40)
WBC NRBC COR # BLD AUTO: 5.16 10*3/MM3 (ref 3.4–10.8)
WHOLE BLOOD HOLD COAG: NORMAL
WHOLE BLOOD HOLD SPECIMEN: NORMAL

## 2024-04-26 PROCEDURE — 83690 ASSAY OF LIPASE: CPT | Performed by: EMERGENCY MEDICINE

## 2024-04-26 PROCEDURE — 83880 ASSAY OF NATRIURETIC PEPTIDE: CPT | Performed by: EMERGENCY MEDICINE

## 2024-04-26 PROCEDURE — 78431 MYOCRD IMG PET RST&STRS CT: CPT

## 2024-04-26 PROCEDURE — 36415 COLL VENOUS BLD VENIPUNCTURE: CPT

## 2024-04-26 PROCEDURE — 96375 TX/PRO/DX INJ NEW DRUG ADDON: CPT

## 2024-04-26 PROCEDURE — 93306 TTE W/DOPPLER COMPLETE: CPT | Performed by: INTERNAL MEDICINE

## 2024-04-26 PROCEDURE — 25010000002 REGADENOSON 0.4 MG/5ML SOLUTION: Performed by: PHYSICIAN ASSISTANT

## 2024-04-26 PROCEDURE — 93005 ELECTROCARDIOGRAM TRACING: CPT | Performed by: EMERGENCY MEDICINE

## 2024-04-26 PROCEDURE — 93018 CV STRESS TEST I&R ONLY: CPT | Performed by: INTERNAL MEDICINE

## 2024-04-26 PROCEDURE — 78431 MYOCRD IMG PET RST&STRS CT: CPT | Performed by: INTERNAL MEDICINE

## 2024-04-26 PROCEDURE — 0 RUBIDIUM CHLORIDE: Performed by: PHYSICIAN ASSISTANT

## 2024-04-26 PROCEDURE — 93306 TTE W/DOPPLER COMPLETE: CPT

## 2024-04-26 PROCEDURE — 85379 FIBRIN DEGRADATION QUANT: CPT | Performed by: PHYSICIAN ASSISTANT

## 2024-04-26 PROCEDURE — 80053 COMPREHEN METABOLIC PANEL: CPT | Performed by: EMERGENCY MEDICINE

## 2024-04-26 PROCEDURE — 96374 THER/PROPH/DIAG INJ IV PUSH: CPT

## 2024-04-26 PROCEDURE — 99284 EMERGENCY DEPT VISIT MOD MDM: CPT | Performed by: INTERNAL MEDICINE

## 2024-04-26 PROCEDURE — 71045 X-RAY EXAM CHEST 1 VIEW: CPT

## 2024-04-26 PROCEDURE — 80061 LIPID PANEL: CPT | Performed by: PHYSICIAN ASSISTANT

## 2024-04-26 PROCEDURE — 83735 ASSAY OF MAGNESIUM: CPT | Performed by: EMERGENCY MEDICINE

## 2024-04-26 PROCEDURE — 99285 EMERGENCY DEPT VISIT HI MDM: CPT

## 2024-04-26 PROCEDURE — 84484 ASSAY OF TROPONIN QUANT: CPT | Performed by: EMERGENCY MEDICINE

## 2024-04-26 PROCEDURE — A9555 RB82 RUBIDIUM: HCPCS | Performed by: PHYSICIAN ASSISTANT

## 2024-04-26 PROCEDURE — 93017 CV STRESS TEST TRACING ONLY: CPT

## 2024-04-26 PROCEDURE — 85025 COMPLETE CBC W/AUTO DIFF WBC: CPT | Performed by: EMERGENCY MEDICINE

## 2024-04-26 RX ORDER — NITROGLYCERIN 0.4 MG/1
0.4 TABLET SUBLINGUAL
Status: DISCONTINUED | OUTPATIENT
Start: 2024-04-26 | End: 2024-04-26 | Stop reason: HOSPADM

## 2024-04-26 RX ORDER — ASPIRIN 81 MG/1
324 TABLET, CHEWABLE ORAL ONCE
Status: COMPLETED | OUTPATIENT
Start: 2024-04-26 | End: 2024-04-26

## 2024-04-26 RX ORDER — REGADENOSON 0.08 MG/ML
0.4 INJECTION, SOLUTION INTRAVENOUS ONCE
Status: COMPLETED | OUTPATIENT
Start: 2024-04-26 | End: 2024-04-26

## 2024-04-26 RX ORDER — SODIUM CHLORIDE 0.9 % (FLUSH) 0.9 %
10 SYRINGE (ML) INJECTION AS NEEDED
Status: DISCONTINUED | OUTPATIENT
Start: 2024-04-26 | End: 2024-04-26 | Stop reason: HOSPADM

## 2024-04-26 RX ORDER — CAFFEINE CITRATE 20 MG/ML
60 SOLUTION INTRAVENOUS
Status: COMPLETED | OUTPATIENT
Start: 2024-04-26 | End: 2024-04-26

## 2024-04-26 RX ADMIN — CAFFEINE CITRATE 60 MG: 20 INJECTION, SOLUTION INTRAVENOUS at 13:43

## 2024-04-26 RX ADMIN — RUBIDIUM CHLORIDE RB-82 1 DOSE: 150 INJECTION, SOLUTION INTRAVENOUS at 13:32

## 2024-04-26 RX ADMIN — REGADENOSON 0.4 MG: 0.08 INJECTION, SOLUTION INTRAVENOUS at 13:26

## 2024-04-26 RX ADMIN — ASPIRIN 324 MG: 81 TABLET, CHEWABLE ORAL at 07:52

## 2024-04-26 RX ADMIN — RUBIDIUM CHLORIDE RB-82 1 DOSE: 150 INJECTION, SOLUTION INTRAVENOUS at 13:21

## 2024-04-26 RX ADMIN — NITROGLYCERIN 0.4 MG: 0.4 TABLET SUBLINGUAL at 08:39

## 2024-04-26 NOTE — ED PROVIDER NOTES
Subjective   History of Present Illness  Pt is a 64 yo female presenting to ED with complaints of chest pain. PMHx significant for HTN, Anxiety, Depression and Arthritis. Pt reports developing mid sternal chest pressure yesterday. She reports aching under left shoulder blade, palpitations, nausea, mild SOB and dizziness as well. She denies the CP worsens with exertion. She denies prior cardiac hx. No specific family cardiac hx. She denies syncope, cough, fever, V/D, abdominal pain or leg swelling. She took ASA yesterday. She denies tobacco, drug or ETOH use.     History provided by:  Patient and medical records      Review of Systems   Constitutional:  Negative for fever.   Eyes:  Negative for visual disturbance.   Respiratory:  Positive for shortness of breath. Negative for cough.    Cardiovascular:  Positive for chest pain and palpitations. Negative for leg swelling.   Gastrointestinal:  Positive for nausea. Negative for abdominal pain, diarrhea and vomiting.   Musculoskeletal:  Positive for arthralgias. Negative for back pain.   Neurological:  Positive for dizziness. Negative for syncope, speech difficulty, weakness and headaches.   Psychiatric/Behavioral:  Negative for confusion.        Past Medical History:   Diagnosis Date    Acid reflux     Depression     Fracture     right elbow and left tibial plateau    H/O mammogram     Hypertension     Migraine     Obesity     Osteoarthritis     Pap smear for cervical cancer screening     been about 5 years or more       No Known Allergies    Past Surgical History:   Procedure Laterality Date    KNEE ARTHROSCOPY Bilateral     KNEE SURGERY Left ,        Family History   Problem Relation Age of Onset    Arthritis Mother     Migraines Mother     Stroke Mother     Dementia Mother 75        Lewy Body,  age 80    Hyperlipidemia Mother     Other Mother         lewy body dementia    Breast cancer Sister 55    Diabetes Maternal Grandfather     Heart attack  Maternal Grandfather     Arthritis Maternal Grandmother     Ovarian cancer Neg Hx        Social History     Socioeconomic History    Marital status:    Tobacco Use    Smoking status: Never    Smokeless tobacco: Never   Vaping Use    Vaping status: Never Used   Substance and Sexual Activity    Alcohol use: Not Currently     Comment: rare    Drug use: Never    Sexual activity: Yes     Partners: Male     Birth control/protection: Post-menopausal           Objective   Physical Exam  Vitals and nursing note reviewed.   Constitutional:       Appearance: She is well-developed.   HENT:      Head: Atraumatic.      Nose: Nose normal.   Eyes:      General: Lids are normal.      Conjunctiva/sclera: Conjunctivae normal.      Pupils: Pupils are equal, round, and reactive to light.   Cardiovascular:      Rate and Rhythm: Normal rate and regular rhythm.      Heart sounds: Normal heart sounds.   Pulmonary:      Effort: Pulmonary effort is normal.      Breath sounds: Normal breath sounds. No wheezing.   Abdominal:      General: There is no distension.      Palpations: Abdomen is soft.      Tenderness: There is no abdominal tenderness. There is no guarding or rebound.   Musculoskeletal:         General: No tenderness. Normal range of motion.      Cervical back: Normal range of motion and neck supple.   Skin:     General: Skin is warm and dry.      Findings: No erythema or rash.   Neurological:      Mental Status: She is alert and oriented to person, place, and time.      Sensory: No sensory deficit.   Psychiatric:         Speech: Speech normal.         Behavior: Behavior normal.         Procedures           ED Course  ED Course as of 04/26/24 1601   Fri Apr 26, 2024   0834 Discussed patient with Dr. Oliver who is agreeable with ED course and cardiology consult.  [RT]   0836 D-Dimer, Quant: 0.37 [RT]   0836 Magnesium: 2.2 [RT]   0837 proBNP: 150.7 [RT]   0837 HS Troponin T: 8 [RT]   0850 Discussed patient with Julienne with  cardiology for ED consult.  [RT]   1018 HS Troponin T: <6 [RT]   1138 Cardiology plans for stress and ECHO today and if normal will be discharged with holter monitor.  [RT]      ED Course User Index  [RT] Whitley Holliday PA      Recent Results (from the past 24 hour(s))   ECG 12 Lead ED Triage Standing Order; Chest Pain    Collection Time: 04/26/24  7:29 AM   Result Value Ref Range    QT Interval 382 ms    QTC Interval 443 ms   High Sensitivity Troponin T    Collection Time: 04/26/24  7:44 AM    Specimen: Blood   Result Value Ref Range    HS Troponin T 8 <14 ng/L   Comprehensive Metabolic Panel    Collection Time: 04/26/24  7:44 AM    Specimen: Blood   Result Value Ref Range    Glucose 110 (H) 65 - 99 mg/dL    BUN 11 8 - 23 mg/dL    Creatinine 0.79 0.57 - 1.00 mg/dL    Sodium 139 136 - 145 mmol/L    Potassium 4.2 3.5 - 5.2 mmol/L    Chloride 102 98 - 107 mmol/L    CO2 24.0 22.0 - 29.0 mmol/L    Calcium 9.0 8.6 - 10.5 mg/dL    Total Protein 7.9 6.0 - 8.5 g/dL    Albumin 4.0 3.5 - 5.2 g/dL    ALT (SGPT) 19 1 - 33 U/L    AST (SGOT) 20 1 - 32 U/L    Alkaline Phosphatase 55 39 - 117 U/L    Total Bilirubin 0.7 0.0 - 1.2 mg/dL    Globulin 3.9 gm/dL    A/G Ratio 1.0 g/dL    BUN/Creatinine Ratio 13.9 7.0 - 25.0    Anion Gap 13.0 5.0 - 15.0 mmol/L    eGFR 84.7 >60.0 mL/min/1.73   Lipase    Collection Time: 04/26/24  7:44 AM    Specimen: Blood   Result Value Ref Range    Lipase 34 13 - 60 U/L   BNP    Collection Time: 04/26/24  7:44 AM    Specimen: Blood   Result Value Ref Range    proBNP 150.7 0.0 - 900.0 pg/mL   Green Top (Gel)    Collection Time: 04/26/24  7:44 AM   Result Value Ref Range    Extra Tube Hold for add-ons.    Lavender Top    Collection Time: 04/26/24  7:44 AM   Result Value Ref Range    Extra Tube hold for add-on    Gold Top - SST    Collection Time: 04/26/24  7:44 AM   Result Value Ref Range    Extra Tube Hold for add-ons.    Gray Top    Collection Time: 04/26/24  7:44 AM   Result Value Ref Range    Extra  Tube Hold for add-ons.    Light Blue Top    Collection Time: 04/26/24  7:44 AM   Result Value Ref Range    Extra Tube Hold for add-ons.    CBC Auto Differential    Collection Time: 04/26/24  7:44 AM    Specimen: Blood   Result Value Ref Range    WBC 5.16 3.40 - 10.80 10*3/mm3    RBC 5.31 (H) 3.77 - 5.28 10*6/mm3    Hemoglobin 13.6 12.0 - 15.9 g/dL    Hematocrit 43.1 34.0 - 46.6 %    MCV 81.2 79.0 - 97.0 fL    MCH 25.6 (L) 26.6 - 33.0 pg    MCHC 31.6 31.5 - 35.7 g/dL    RDW 15.0 12.3 - 15.4 %    RDW-SD 44.9 37.0 - 54.0 fl    MPV 9.5 6.0 - 12.0 fL    Platelets 234 140 - 450 10*3/mm3    Neutrophil % 64.5 42.7 - 76.0 %    Lymphocyte % 27.3 19.6 - 45.3 %    Monocyte % 6.6 5.0 - 12.0 %    Eosinophil % 1.0 0.3 - 6.2 %    Basophil % 0.4 0.0 - 1.5 %    Immature Grans % 0.2 0.0 - 0.5 %    Neutrophils, Absolute 3.33 1.70 - 7.00 10*3/mm3    Lymphocytes, Absolute 1.41 0.70 - 3.10 10*3/mm3    Monocytes, Absolute 0.34 0.10 - 0.90 10*3/mm3    Eosinophils, Absolute 0.05 0.00 - 0.40 10*3/mm3    Basophils, Absolute 0.02 0.00 - 0.20 10*3/mm3    Immature Grans, Absolute 0.01 0.00 - 0.05 10*3/mm3    nRBC 0.0 0.0 - 0.2 /100 WBC   D-dimer, Quantitative    Collection Time: 04/26/24  7:44 AM    Specimen: Blood   Result Value Ref Range    D-Dimer, Quantitative 0.37 0.00 - 0.62 MCGFEU/mL   Magnesium    Collection Time: 04/26/24  7:44 AM    Specimen: Blood   Result Value Ref Range    Magnesium 2.2 1.6 - 2.4 mg/dL   ECG 12 Lead ED Triage Standing Order; Chest Pain    Collection Time: 04/26/24  9:36 AM   Result Value Ref Range    QT Interval 410 ms    QTC Interval 410 ms   High Sensitivity Troponin T 2Hr    Collection Time: 04/26/24  9:37 AM    Specimen: Blood   Result Value Ref Range    HS Troponin T <6 <14 ng/L    Troponin T Delta     Lipid Panel    Collection Time: 04/26/24  9:37 AM    Specimen: Blood   Result Value Ref Range    Total Cholesterol 216 (H) 0 - 200 mg/dL    Triglycerides 103 0 - 150 mg/dL    HDL Cholesterol 46 40 - 60 mg/dL     LDL Cholesterol  151 (H) 0 - 100 mg/dL    VLDL Cholesterol 19 5 - 40 mg/dL    LDL/HDL Ratio 3.25    Stress Test With Pet Myocardial Perfusion    Collection Time: 04/26/24  1:18 PM   Result Value Ref Range    BH CV STRESS PROTOCOL 1 Pharmacologic     Stage 1 1.0     Duration Min Stage 1 1     Duration Sec Stage 1 0     Stress Dose Regadenoson Stage 1 0.40     Stress Comments Stage 1 10 sec bolus injection     Stage 2 2.0     Duration Min Stage 2 1     Duration Sec Stage 2 0     Stage 3 3.0     Duration Min Stage 3 1     Duration Sec Stage 3 0     Stage 4 4.0     Duration Min Stage 4 1     Duration Sec Stage 4 0     Baseline HR 65 bpm    Baseline /86 mmHg    O2 sat rest 93 %    Target HR (85%) 134 bpm    Max. Pred. HR (100%) 158 bpm    Exercise duration (min) 4 min    Exercise duration (sec) 0 sec    Estimated workload 1.0 METS    HR Stage 1 95     O2 Stage 1 96     HR Stage 2 100     BP Stage 2 139/93     O2 Stage 2 96     HR Stage 3 98     O2 Stage 3 96     HR Stage 4 95     BP Stage 4 133/72     O2 Stage 4 95     Peak  bpm    Peak /93 mmHg    O2 sat peak 96 %    Recovery O2 95 %    Percent Max Pred HR 67.09 %    Percent Target HR 79 %    Recovery HR 88 bpm    Recovery /68 mmHg    BH CV REST NUCLEAR ISOTOPE DOSE 29.9 mCi    BH CV STRESS NUCLEAR ISOTOPE DOSE 29.9 mCi    Nuc Stress EF 68 %   Adult Transthoracic Echo Complete W/ Cont if Necessary Per Protocol    Collection Time: 04/26/24  2:21 PM   Result Value Ref Range    EF(MOD-bp) 60.5 %    LVIDd 4.2 cm    LVIDs 2.6 cm    IVSd 0.90 cm    LVPWd 0.90 cm    FS 38.1 %    IVS/LVPW 1.00 cm    ESV(cubed) 17.6 ml    LV Sys Vol (BSA corrected) 15.2 cm2    EDV(cubed) 74.1 ml    LV Olmedo Vol (BSA corrected) 38.1 cm2    LV mass(C)d 118.7 grams    LVOT area 3.1 cm2    LVOT diam 2.00 cm    EDV(MOD-sp2) 95.9 ml    EDV(MOD-sp4) 69.6 ml    ESV(MOD-sp2) 38.5 ml    ESV(MOD-sp4) 27.8 ml    SV(MOD-sp2) 57.4 ml    SV(MOD-sp4) 41.8 ml    SVi(MOD-SP2) 31.4 ml/m2     "SVi(MOD-SP4) 22.9 ml/m2    SVi (LVOT) 35.1 ml/m2    EF(MOD-sp2) 59.9 %    EF(MOD-sp4) 60.1 %    MV E max ryley 81.5 cm/sec    MV A max ryley 87.4 cm/sec    MV dec time 0.23 sec    MV E/A 0.93     LA ESV Index (BP) 25.3 ml/m2    Med Peak E' Ryley 11.5 cm/sec    Lat Peak E' Ryley 13.8 cm/sec    TR max ryley 249.8 cm/sec    Avg E/e' ratio 6.44     SV(LVOT) 64.1 ml    RV Base 4.0 cm    RV Mid 3.0 cm    RV Length 7.7 cm    TAPSE (>1.6) 2.8 cm    RV S' 16.9 cm/sec    LA dimension (2D)  3.9 cm    LV V1 max 97.3 cm/sec    LV V1 max PG 3.8 mmHg    LV V1 mean PG 2.00 mmHg    LV V1 VTI 20.4 cm    Ao pk ryley 144.0 cm/sec    Ao max PG 8.3 mmHg    Ao mean PG 4.0 mmHg    Ao V2 VTI 29.4 cm    ELIZABETH(I,D) 2.18 cm2    MV max PG 4.5 mmHg    MV mean PG 2.00 mmHg    MV V2 VTI 31.1 cm    MV P1/2t 66.1 msec    MVA(P1/2t) 3.3 cm2    MVA(VTI) 2.06 cm2    MV dec slope 483.0 cm/sec2    TR max PG 25.0 mmHg    PA acc time 0.16 sec    Ao root diam 2.9 cm    RVSP(TR) 28 mmHg    RAP systole 3 mmHg    BH CV VAS BP RIGHT /86 mmHg     Note: In addition to lab results from this visit, the labs listed above may include labs taken at another facility or during a different encounter within the last 24 hours. Please correlate lab times with ED admission and discharge times for further clarification of the services performed during this visit.    XR Chest 1 View   Final Result   Impression:   No evidence of acute cardiopulmonary disease.          Electronically Signed: Zoltan Ernst MD     4/26/2024 8:00 AM EDT     Workstation ID: [id number]        Vitals:    04/26/24 1255 04/26/24 1256 04/26/24 1318 04/26/24 1353   BP:   132/86    BP Location:       Patient Position:       Pulse: 77 80 64    Resp:       Temp:       TempSrc:       SpO2: 95% 93% 93%    Weight:    77.1 kg (169 lb 15.6 oz)   Height:    162.6 cm (64.02\")     Medications   sodium chloride 0.9 % flush 10 mL (has no administration in time range)   nitroglycerin (NITROSTAT) SL tablet 0.4 mg (0.4 mg " Sublingual Given 4/26/24 0839)   aspirin chewable tablet 324 mg (324 mg Oral Given 4/26/24 0752)   regadenoson (LEXISCAN) injection 0.4 mg (0.4 mg Intravenous Given 4/26/24 1326)   caffeine citrated (CAFCIT) injection 60 mg (60 mg Intravenous Given 4/26/24 1343)   rubidium chloride (CARDIOGEN) injection 1 dose (1 dose Intravenous Given 4/26/24 1321)   rubidium chloride (CARDIOGEN) injection 1 dose (1 dose Intravenous Given 4/26/24 1332)     ECG/EMG Results (last 24 hours)       Procedure Component Value Units Date/Time    ECG 12 Lead ED Triage Standing Order; Chest Pain [[id number]] Collected: 04/26/24 0729     Updated: 04/26/24 0740     QT Interval 382 ms      QTC Interval 443 ms     Narrative:      Test Reason : ED Triage Standing Order~  Blood Pressure :   */*   mmHG  Vent. Rate :  81 BPM     Atrial Rate :  81 BPM     P-R Int : 122 ms          QRS Dur :  88 ms      QT Int : 382 ms       P-R-T Axes :  35  40  43 degrees     QTc Int : 443 ms    Sinus rhythm with frequent premature ventricular complexes  Otherwise normal ECG  When compared with ECG of 14-AUG-2022 20:13,  premature ventricular complexes are now present    Referred By: EDMD           Confirmed By:           ECG 12 Lead ED Triage Standing Order; Chest Pain   Preliminary Result   Test Reason : ED Triage Standing Order~   Blood Pressure :   */*   mmHG   Vent. Rate :  60 BPM     Atrial Rate :  60 BPM      P-R Int : 110 ms          QRS Dur :  88 ms       QT Int : 410 ms       P-R-T Axes : -14  30  24 degrees      QTc Int : 410 ms      Sinus rhythm with short ND   Otherwise normal ECG   When compared with ECG of 26-APR-2024 07:29, (Unconfirmed)   premature ventricular complexes are no longer present      Referred By: EDMD           Confirmed By:       ECG 12 Lead ED Triage Standing Order; Chest Pain   Preliminary Result   Test Reason : ED Triage Standing Order~   Blood Pressure :   */*   mmHG   Vent. Rate :  81 BPM     Atrial Rate :  81 BPM      P-R Int :  122 ms          QRS Dur :  88 ms       QT Int : 382 ms       P-R-T Axes :  35  40  43 degrees      QTc Int : 443 ms      Sinus rhythm with frequent premature ventricular complexes   Otherwise normal ECG   When compared with ECG of 14-AUG-2022 20:13,   premature ventricular complexes are now present      Referred By: EDMD           Confirmed By:                                                  Medical Decision Making  Pt is a 64 yo female presenting to ED with complaints of chest pain. EKG NSR without acute ischemic changes and PVCs. Personally interpreted labs with notable findings WBC 5.16, Cr 0.79, Glucose 110, K 4.2, HS Trop 8 and repeat 6,  and Ddimer 0.37. Personally interpreted radiology imaging and official read provided by radiology with notable findings of normal CXR. Patient did have some relief with the nitro in ED. Consulted cardiology who evaluated patient in ED and performed ECHO and stress that were normal. Patient set up with holter and Metoprolol Rx. She will f/u with PCP and Cards outpatient. She is agreeable with plan and will return to ED if new / worse sx.     Discussed patient with Dr. Oliver. Who is agreeable with ED course.  Discussed patient with cardiologist Dr. Nelson.     DDx  ACS, NSTEMI, PE, Pneumonia, CHF, Angina, Arrhythmia       Problems Addressed:  Chest pain, unspecified type: complicated acute illness or injury  Hypertension, unspecified type: complicated acute illness or injury  Palpitations: complicated acute illness or injury  PVC's (premature ventricular contractions): complicated acute illness or injury    Amount and/or Complexity of Data Reviewed  External Data Reviewed: notes.     Details: Reviewed previous non ED visits including prior labs, imaging, available notes, medications, allergies and surgical hx.     Labs: ordered. Decision-making details documented in ED Course.  Radiology: ordered. Decision-making details documented in ED Course.  ECG/medicine tests:  ordered. Decision-making details documented in ED Course.    Risk  OTC drugs.  Prescription drug management.        Final diagnoses:   Chest pain, unspecified type   Palpitations   PVC's (premature ventricular contractions)   Hypertension, unspecified type       ED Disposition  ED Disposition       ED Disposition   Discharge    Condition   Stable    Comment   --               Brock Nelson MD  [address] [place] [address]   [place] [address] [phone]    Schedule an appointment as soon as possible for a visit in 2 month(s)  2-3 month ER follow up in [place] office    Hailee Doss MD  [address]  [facility] [address]  [phone]          [facility] EMERGENCY DEPARTMENT  [address] [facility] [address] [phone]    If symptoms worsen         Medication List        New Prescriptions      metoprolol tartrate 25 MG tablet  Commonly known as: LOPRESSOR  Take 1 tablet by mouth 2 (Two) Times a Day As Needed (palpitations).               Where to Get Your Medications        These medications were sent to [facility] [id number] - [place] [place] [address] [facility] [facility] [phone] [facility] [phone]   [address] [facility] [address] [facility] [address]      Phone: [phone]   metoprolol tartrate 25 MG tablet            Whitley Holliday PA  04/26/24 1604

## 2024-04-26 NOTE — CONSULTS
[facility]  CARDIOLOGY CONSULTATION NOTE    Alyssia Moody  : 1960  MRN:[id number]  Home Phone:[phone]    Date of Consultation: 24    PCP: Hailee Doss MD    IDENTIFICATION: A 63 y.o. 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 She presented to the ER today with midsternal chest tightness/squeezing type pain that has been ongoing intermittently since yesterday afternoon. It is associated with palpitations/ awareness of irregular heart rhythm, and has radiated to her left shoulder/scapular region. She states she is usually active, and has not noted any recent chest pain with exertion or unusual SILVA. No syncope or pre-syncope, did have an episode of light-headedness about a week ago related to working out in hot weather. She denies history of HTN, although BP is noted to be elevated in the ER today, no history of hyperlipidemia or diabetes. No tobacco or alcohol use, and no significant family history of precocious CAD. She is a hospice medicine physician. In the ER, HS troponins are negative x2, and EKGs do not show any acute changes, however initial EKG showed frequent PVCs and she has frequent PVCs on telemetry, at times in bigeminy. She was given SL Nitro which helped her chest tightness and head pressure.       ROS: All systems have been reviewed and are negative with the exception of those mentioned in the HPI and problem list above.    Surgical History:   Past Surgical History:   Procedure  "Laterality Date    KNEE ARTHROSCOPY Bilateral     KNEE SURGERY Left ,        Social History:   Social History     Socioeconomic History    Marital status:    Tobacco Use    Smoking status: Never    Smokeless tobacco: Never   Vaping Use    Vaping status: Never Used   Substance and Sexual Activity    Alcohol use: Not Currently     Comment: rare    Drug use: Never    Sexual activity: Yes     Partners: Male     Birth control/protection: Post-menopausal       Family History:   Family History   Problem Relation Age of Onset    Arthritis Mother     Migraines Mother     Stroke Mother     Dementia Mother 75        Lewy Body,  age 80    Hyperlipidemia Mother     Other Mother         lewy body dementia    Breast cancer Sister 55    Diabetes Maternal Grandfather     Heart attack Maternal Grandfather     Arthritis Maternal Grandmother     Ovarian cancer Neg Hx        Objective     /82   Pulse 59   Temp 98 °F (36.7 °C) (Oral)   Resp 16   Ht 162.6 cm (64\")   Wt 77.1 kg (170 lb)   SpO2 96%   BMI 29.18 kg/m²   No intake or output data in the 24 hours ending 24 1038    PHYSICAL EXAM:  CONSTITUTIONAL: Well nourished, cooperative, in no acute distress  HEENT: Normocephalic, atraumatic, PERRLA, no JVD  CARDIOVASCULAR:  Regular rhythm and normal rate, no murmur, gallop, rub.   RESPIRATORY: Clear to auscultation, normal respiratory effort, no wheezing, rales or rhonchi  EXTREMITIES: No gross deformities, no edema.   SKIN: Warm, dry. No bleeding, bruising or rash  NEUROLOGICAL: No focal deficits    Labs/Diagnostic Data  Results from last 7 days   Lab Units 24  0744   SODIUM mmol/L 139   POTASSIUM mmol/L 4.2   CHLORIDE mmol/L 102   CO2 mmol/L 24.0   BUN mg/dL 11   CREATININE mg/dL 0.79   GLUCOSE mg/dL 110*   CALCIUM mg/dL 9.0     Results from last 7 days   Lab Units 24  0937 24  0744   HSTROP T ng/L <6 8     Results from last 7 days   Lab Units 24  0744   WBC 10*3/mm3 5.16 "   HEMOGLOBIN g/dL 13.6   HEMATOCRIT % 43.1   PLATELETS 10*3/mm3 234     Results from last 7 days   Lab Units 04/26/24  0744   MAGNESIUM mg/dL 2.2           Results from last 7 days   Lab Units 04/26/24  0744   PROBNP pg/mL 150.7           I personally reviewed the patient's EKG/Telemetry data    Radiology Data:   XR Chest 1 View    Result Date: 4/26/2024  Impression: No evidence of acute cardiopulmonary disease. Electronically Signed: Zoltan Ernst MD  4/26/2024 8:00 AM EDT  Workstation ID: [id number]       Assessment and Plan:     1. Chest pain/tightness  - HS troponins negative x2, EKGs with no acute ischemic changes  - no prior cardiac evaluation  - will obtain echo and stress PET today for further evaluation    2. Frequent PVCs  - Mg and K level within normal limits  - check echo  - place 7 day monitor if above work up is negative to quantify PVC burden and consider BB    3. Elevated BP  - no prior history of HTN    4. Hyperlipidemia   - last lipid panel reviewed with significantly elevated LDL   - check lipid panel today      Scribed for Brock Nelson MD by Sari Russell PA-C. 4/26/2024  11:27 EDT    I,Brock Nelson M.D., personally performed the services described in this documentation as scribed by the above named individual in my presence, and it is both accurate and complete.    Brock Nelson MD, [facility], [facility]  04/26/24  13:09 EDT

## 2024-05-01 LAB
QT INTERVAL: 382 MS
QT INTERVAL: 410 MS
QTC INTERVAL: 410 MS
QTC INTERVAL: 443 MS

## 2024-05-21 ENCOUNTER — PATIENT MESSAGE (OUTPATIENT)
Dept: CARDIOLOGY | Facility: CLINIC | Age: 64
End: 2024-05-21
Payer: COMMERCIAL

## 2024-05-21 RX ORDER — BISOPROLOL FUMARATE 5 MG/1
5 TABLET, FILM COATED ORAL DAILY
Qty: 30 TABLET | Refills: 5 | Status: SHIPPED | OUTPATIENT
Start: 2024-05-21

## 2024-05-29 ENCOUNTER — OFFICE VISIT (OUTPATIENT)
Dept: INTERNAL MEDICINE | Facility: CLINIC | Age: 64
End: 2024-05-29
Payer: COMMERCIAL

## 2024-05-29 VITALS
RESPIRATION RATE: 14 BRPM | DIASTOLIC BLOOD PRESSURE: 74 MMHG | TEMPERATURE: 97.7 F | SYSTOLIC BLOOD PRESSURE: 112 MMHG | HEART RATE: 53 BPM | WEIGHT: 197.2 LBS | HEIGHT: 64 IN | OXYGEN SATURATION: 96 % | BODY MASS INDEX: 33.67 KG/M2

## 2024-05-29 DIAGNOSIS — F33.1 MAJOR DEPRESSIVE DISORDER, RECURRENT EPISODE, MODERATE DEGREE: Primary | ICD-10-CM

## 2024-05-29 DIAGNOSIS — I49.3 FREQUENT PVCS: Chronic | ICD-10-CM

## 2024-05-29 PROBLEM — E55.9 VITAMIN D DEFICIENCY: Status: ACTIVE | Noted: 2024-05-29

## 2024-05-29 PROCEDURE — 99214 OFFICE O/P EST MOD 30 MIN: CPT | Performed by: INTERNAL MEDICINE

## 2024-05-29 NOTE — PROGRESS NOTES
Chief Complaint  Chest Pain (Follow up from ER, had Holter monitor completed as well, still having pressure in head, possibly related to elevated BP)    Subjective          Alyssia Moody presents to [facility]  History of Present Illness    PVCs-patient presented to the ER last month with 1 day history of midsternal chest tightness/squeezing type pain, associated with palpitations/ awareness of irregular heart rhythm, and has radiated to her left shoulder/scapular region.  No shortness of breath.  Did have an episode of dizziness a week prior to presentation. In the ER, HS troponins were negative x2, and EKGs did not show any acute changes, but her initial EKG showed frequent PVCs and she has frequent PVCs on telemetry, at times in bigeminy. She was given SL Nitro in ER which helped her chest tightness and head pressure.  She did have echo and stress test which were negative and was started on metoprolol.  She had a heart monitor done which showed 13% PVC burden.  Bisoprolol 5 mg was started and metoprolol discontinued  Palpitations are better, not daily now  Caffeine- has 2  20 oz diet coke.  She would like to try to go off of it  Continue to get frequent head pressure/headaches but she states she has had headaches her whole life.  Did have CT scan of the head after she had a horse fall 2 years ago  She does worry that her blood pressure may still be running high at times.  She has a wrist monitor and a manual arm monitor that is hard for her to do herself    Hyperlipidemia-LDL done in the ED was 151.  It has not been good and she has regained weight    Depression/anxiety-patient is on Lexapro 10 that was started in 3/23. Has helped some, but not as well as she would like.  SHe has both sadness and worries a lot  Her daughters have done well on zoloft  Did do a few therapy sessions but feels like she just cries when she talks about her son so prefers not to do therapy at this  "point      Current Outpatient Medications:     bisoprolol (ZEBeta) 5 MG tablet, Take 1 tablet by mouth Daily., Disp: 30 tablet, Rfl: 5    ibuprofen (ADVIL,MOTRIN) 200 MG tablet, Take 4 tablets by mouth 2 (Two) Times a Day., Disp: , Rfl:     metoprolol tartrate (LOPRESSOR) 25 MG tablet, Take 1 tablet by mouth 2 (Two) Times a Day As Needed (palpitations)., Disp: 60 tablet, Rfl: 3    sertraline (Zoloft) 50 MG tablet, 1/2 qd x 1 week, then raise to 1 qd, Disp: 30 tablet, Rfl: 1   The following portions of the patient's history were reviewed and updated as appropriate: allergies, current medications, past family history, past medical history, past social history, past surgical history and problem list.     Objective   Vital Signs:   /74   Pulse 53   Temp 97.7 °F (36.5 °C) (Infrared)   Resp 14   Ht 162.6 cm (64.02\")   Wt 89.4 kg (197 lb 3.2 oz)   SpO2 96%   BMI 33.83 kg/m²       Physical exam  Constitutional: oriented to person, place, and time.  well-developed and well-nourished. No distress.   HENT:   Head: Normocephalic and atraumatic.   Eyes: Conjunctivae and EOM are normal.   Cardiovascular: Normal rate, regular rhythm and normal heart sounds.  Exam reveals no gallop and no friction rub.    No murmur heard.  Pulmonary/Chest: Effort normal and breath sounds normal. No respiratory distress.   no wheezes.   Neurological:  alert and oriented to person, place, and time.   Skin: Skin is warm and dry. not diaphoretic.   Psychiatric:  normal mood and affect. behavior is normal. Judgment and thought content normal.      Result Review :                   Assessment and Plan    Diagnoses and all orders for this visit:    1. Major depressive disorder, recurrent episode, moderate degree (Primary)-we will switch to Zoloft since her daughters have done well with this medication.  Discussed she can do a cross titration and do half dose of Lexapro the first week she is on the Zoloft.  She has follow-up in July and we " will see how she is doing  -     sertraline (Zoloft) 50 MG tablet; 1/2 qd x 1 week, then raise to 1 qd  Dispense: 30 tablet; Refill: 1    2. Frequent PVCs-have improved with bisoprolol.  Has follow-up with cardiology        Follow Up   No follow-ups on file.  Patient was given instructions and counseling regarding her condition or for health maintenance advice. Please see specific information pulled into the AVS if appropriate.

## 2024-07-08 ENCOUNTER — OFFICE VISIT (OUTPATIENT)
Dept: CARDIOLOGY | Facility: CLINIC | Age: 64
End: 2024-07-08
Payer: COMMERCIAL

## 2024-07-08 VITALS
WEIGHT: 189.6 LBS | HEIGHT: 64 IN | HEART RATE: 57 BPM | SYSTOLIC BLOOD PRESSURE: 116 MMHG | DIASTOLIC BLOOD PRESSURE: 70 MMHG | BODY MASS INDEX: 32.37 KG/M2 | OXYGEN SATURATION: 95 %

## 2024-07-08 DIAGNOSIS — I49.3 FREQUENT PVCS: Primary | Chronic | ICD-10-CM

## 2024-07-08 PROCEDURE — 99214 OFFICE O/P EST MOD 30 MIN: CPT | Performed by: INTERNAL MEDICINE

## 2024-07-08 RX ORDER — BISOPROLOL FUMARATE 5 MG/1
5 TABLET, FILM COATED ORAL DAILY
Qty: 90 TABLET | Refills: 3 | Status: SHIPPED | OUTPATIENT
Start: 2024-07-08

## 2024-07-08 NOTE — PROGRESS NOTES
OFFICE VISIT  NOTE  [facility] CARDIOLOGY      Name: Alyssia Moody    Date: 2024  MRN:  [id number]  :  1960      REFERRING/PRIMARY PROVIDER:  Hailee Doss MD    Chief Complaint   Patient presents with    frequent premature ventricular contractions        HPI: Alyssia Moody is a 64 y.o. female who presents for frequent PVCs.  Holter 2024 showed 13% PVCs patient started on bisoprolol 5 mg daily.  Benign echo and stress test 2024 after presentation to the ER for chest pain.    Doing quite well, no palpitations or chest pain.  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    Past Medical History:   Diagnosis Date    Acid reflux     Depression     Fracture     right elbow and left tibial plateau    H/O mammogram     History of medical problems over last few months    new bigeminy    Hypertension     Migraine     Obesity     Osteoarthritis     Pap smear for cervical cancer screening     been about 5 years or more       Past Surgical History:   Procedure Laterality Date    KNEE ARTHROSCOPY Bilateral     KNEE SURGERY Left ,        Social History     Socioeconomic History    Marital status:    Tobacco Use    Smoking status: Never    Smokeless tobacco: Never   Vaping Use    Vaping status: Never Used   Substance and Sexual Activity    Alcohol use: Not Currently     Comment: rare    Drug use: Never    Sexual activity: Yes     Partners: Male     Birth control/protection: Post-menopausal       Family History   Problem Relation Age of Onset    Arthritis Mother     Migraines Mother     Stroke Mother     Dementia Mother 75        Lewy Body,  age 80    Hyperlipidemia Mother     Other Mother         lewy body dementia    Breast cancer Sister 55    Diabetes Maternal Grandfather     Heart attack Maternal Grandfather     Arthritis Maternal Grandmother     Ovarian cancer Neg Hx         ROS:   Constitutional no fever,   "no weight loss   Skin no rash, no subcutaneous nodules   Otolaryngeal no difficulty swallowing   Cardiovascular See HPI   Pulmonary no cough, no sputum production   Gastrointestinal no constipation, no diarrhea   Genitourinary no dysuria, no hematuria   Hematologic no easy bruisability, no abnormal bleeding   Musculoskeletal no muscle pain   Neurologic no dizziness, no falls         No Known Allergies      Current Outpatient Medications:     bisoprolol (ZEBeta) 5 MG tablet, Take 1 tablet by mouth Daily., Disp: 90 tablet, Rfl: 3    ibuprofen (ADVIL,MOTRIN) 200 MG tablet, Take 4 tablets by mouth 2 (Two) Times a Day., Disp: , Rfl:     sertraline (Zoloft) 50 MG tablet, 1/2 qd x 1 week, then raise to 1 qd, Disp: 30 tablet, Rfl: 1    Vitals:    07/08/24 0914   BP: 116/70   BP Location: Left arm   Patient Position: Sitting   Pulse: 57   SpO2: 95%   Weight: 86 kg (189 lb 9.6 oz)   Height: 162.6 cm (64\")     Body mass index is 32.54 kg/m².    PHYSICAL EXAM:    General Appearance:   well developed  well nourished  HENT:   oropharynx moist  lips not cyanotic  Neck:  thyroid not enlarged  supple  Respiratory:  no respiratory distress  normal breath sounds  no rales  Cardiovascular:  no jugular venous distention  regular rhythm  apical impulse normal  S1 normal, S2 normal  no S3, no S4   no murmur  no rub, no thrill  carotid pulses normal; no bruit  lower extremity edema: none      Musculoskeletal:  no clubbing of fingers.   normocephalic, head atraumatic  Skin:   warm, dry  Psychiatric:  judgement and insight appropriate  normal mood and affect    RESULTS:   Procedures    Results for orders placed during the hospital encounter of 04/26/24    Adult Transthoracic Echo Complete W/ Cont if Necessary Per Protocol    Interpretation Summary    Left ventricular systolic function is normal. Calculated left ventricular EF = 60.5% Left ventricular ejection fraction appears to be 56 - 60%.    Left ventricular diastolic function was " "normal.    Estimated right ventricular systolic pressure from tricuspid regurgitation is normal (<35 mmHg). Calculated right ventricular systolic pressure from tricuspid regurgitation is 28 mmHg.    No significant structural or functional valvular disease.        Labs:  Lab Results   Component Value Date    CHOL 216 (H) 04/26/2024    TRIG 103 04/26/2024    HDL 46 04/26/2024     (H) 04/26/2024    AST 20 04/26/2024    ALT 19 04/26/2024     Lab Results   Component Value Date    HGBA1C 5.30 06/21/2021     No components found for: \"CREATINININE\"  eGFR Non  Amer   Date Value Ref Range Status   06/21/2021 60 (L) >60 mL/min/1.73 Final       Most recent PCP note, imaging tests, and labs reviewed.    ASSESSMENT:  Problem List Items Addressed This Visit       Frequent PVCs - Primary (Chronic)    Overview     Holter placed 4/26/24 at [facility] er         Relevant Medications    bisoprolol (ZEBeta) 5 MG tablet       PLAN:    1.  Chest pain:  Benign workup with low risk stress test and echo 5/2024  Continue healthy lifestyle measures    2.  PVCs:  Continue adequate hydration and beta-blocker  13% on Holter 5/2024  Refilled bisoprolol for 1 year    At next appointment if symptoms are stable we may decrease bisoprolol by half and eventually wean off of it.    Discussed importance of hydration, minimizing caffeine, and adequate sleep with good exercise habits    3.  Hyperlipidemia:  Lipid panel 4/26/2024 showed total cholesterol 216, HDL 46   Continue low saturated fat diet.        Return to clinic in 12 months, or sooner as needed.    Thank you for the opportunity to share in the care of your patient; please do not hesitate to call me with any questions.     Brock Nelson MD, [facility], [facility]  Office: [phone]  [facility] [place] [address]    07/08/24    "

## 2024-07-19 ENCOUNTER — OFFICE VISIT (OUTPATIENT)
Dept: INTERNAL MEDICINE | Facility: CLINIC | Age: 64
End: 2024-07-19
Payer: COMMERCIAL

## 2024-07-19 DIAGNOSIS — Z13.820 SCREENING FOR OSTEOPOROSIS: ICD-10-CM

## 2024-07-19 DIAGNOSIS — F33.1 MAJOR DEPRESSIVE DISORDER, RECURRENT EPISODE, MODERATE DEGREE: ICD-10-CM

## 2024-07-19 DIAGNOSIS — Z01.419 ROUTINE GYNECOLOGICAL EXAMINATION: ICD-10-CM

## 2024-07-19 DIAGNOSIS — Z12.31 ENCOUNTER FOR SCREENING MAMMOGRAM FOR MALIGNANT NEOPLASM OF BREAST: ICD-10-CM

## 2024-07-19 DIAGNOSIS — Z00.00 ROUTINE GENERAL MEDICAL EXAMINATION AT A HEALTH CARE FACILITY: Primary | ICD-10-CM

## 2024-07-19 DIAGNOSIS — Z23 NEED FOR SHINGLES VACCINE: ICD-10-CM

## 2024-07-19 PROCEDURE — 90750 HZV VACC RECOMBINANT IM: CPT | Performed by: INTERNAL MEDICINE

## 2024-07-19 PROCEDURE — 90471 IMMUNIZATION ADMIN: CPT | Performed by: INTERNAL MEDICINE

## 2024-07-25 LAB — REF LAB TEST METHOD: NORMAL

## 2024-11-07 LAB
NCCN CRITERIA FLAG: NORMAL
TYRER CUZICK SCORE: 11.8

## 2024-11-14 ENCOUNTER — HOSPITAL ENCOUNTER (OUTPATIENT)
Dept: MAMMOGRAPHY | Facility: HOSPITAL | Age: 64
Discharge: HOME | End: 2024-11-14
Admitting: INTERNAL MEDICINE
Payer: COMMERCIAL

## 2024-11-14 DIAGNOSIS — Z12.31 ENCOUNTER FOR SCREENING MAMMOGRAM FOR MALIGNANT NEOPLASM OF BREAST: ICD-10-CM

## 2024-11-14 PROCEDURE — 77067 SCR MAMMO BI INCL CAD: CPT

## 2024-11-14 PROCEDURE — 77063 BREAST TOMOSYNTHESIS BI: CPT

## 2024-11-25 ENCOUNTER — TELEPHONE (OUTPATIENT)
Dept: INTERNAL MEDICINE | Facility: CLINIC | Age: 64
End: 2024-11-25
Payer: COMMERCIAL

## 2024-11-25 NOTE — TELEPHONE ENCOUNTER
----- Message from Hailee Doss sent at 11/24/2024  7:43 AM EST -----  Regarding: fastibg labs and 2nd shingrix  Can you remind her to stop by for fasting labs and her 2nd shingrix?

## 2025-06-30 DIAGNOSIS — F33.1 MAJOR DEPRESSIVE DISORDER, RECURRENT EPISODE, MODERATE DEGREE: ICD-10-CM

## 2025-07-01 RX ORDER — BISOPROLOL FUMARATE 5 MG/1
5 TABLET, FILM COATED ORAL DAILY
Qty: 90 TABLET | Refills: 0 | Status: SHIPPED | OUTPATIENT
Start: 2025-07-01

## 2025-07-14 ENCOUNTER — OFFICE VISIT (OUTPATIENT)
Dept: CARDIOLOGY | Facility: CLINIC | Age: 65
End: 2025-07-14
Payer: COMMERCIAL

## 2025-07-14 VITALS
SYSTOLIC BLOOD PRESSURE: 114 MMHG | WEIGHT: 190 LBS | DIASTOLIC BLOOD PRESSURE: 70 MMHG | BODY MASS INDEX: 32.44 KG/M2 | HEIGHT: 64 IN | OXYGEN SATURATION: 98 % | HEART RATE: 61 BPM

## 2025-07-14 DIAGNOSIS — I49.3 FREQUENT PVCS: Primary | Chronic | ICD-10-CM

## 2025-07-14 PROCEDURE — 99214 OFFICE O/P EST MOD 30 MIN: CPT | Performed by: INTERNAL MEDICINE

## 2025-07-14 NOTE — PROGRESS NOTES
OFFICE VISIT  NOTE  [facility] CARDIOLOGY      Name: Alyssia Moody    Date: 2025  MRN:  [id number]  :  1960      REFERRING/PRIMARY PROVIDER:  Hailee Doss MD    Chief Complaint   Patient presents with    Frequent PVCs       HPI: Alyssia Moody is a 65 y.o. female who presents for frequent PVCs.  Holter 2024 showed 13% PVCs patient started on bisoprolol 5 mg daily.  Benign echo and stress test 2024 after presentation to the ER for chest pain.    Doing quite well, no palpitations or chest pain.  2025, reports some exercise intolerance mainly when it is very hot outside, she has tried to wean herself off bisoprolol by cutting in half for 4 to 7 days but then she gets more palpitations at night goes back on the full dose.    Past Medical History:   Diagnosis Date    Abnormal ECG ED visit 24    Acid reflux     Arrhythmia as above    Depression     Fracture     right elbow and left tibial plateau    H/O mammogram     History of medical problems over last few months    new bigeminy    Hyperlipidemia 24    Hypertension     Migraine     Obesity     Osteoarthritis     Pap smear for cervical cancer screening     been about 5 years or more       Past Surgical History:   Procedure Laterality Date    KNEE ARTHROSCOPY Bilateral     KNEE SURGERY Left ,        Social History     Socioeconomic History    Marital status:    Tobacco Use    Smoking status: Never    Smokeless tobacco: Never   Vaping Use    Vaping status: Never Used   Substance and Sexual Activity    Alcohol use: Not Currently    Drug use: Never    Sexual activity: Yes     Partners: Male     Birth control/protection: Post-menopausal       Family History   Problem Relation Age of Onset    Arthritis Mother     Migraines Mother     Stroke Mother     Dementia Mother 75        Lewy Body,  age 80    Hyperlipidemia Mother     Other Mother         lewy body dementia    Breast cancer Sister 55     "Diabetes Maternal Grandfather     Heart attack Maternal Grandfather     Arthritis Maternal Grandmother     Ovarian cancer Neg Hx         ROS:   Constitutional no fever,  no weight loss   Skin no rash, no subcutaneous nodules   Otolaryngeal no difficulty swallowing   Cardiovascular See HPI   Pulmonary no cough, no sputum production   Gastrointestinal no constipation, no diarrhea   Genitourinary no dysuria, no hematuria   Hematologic no easy bruisability, no abnormal bleeding   Musculoskeletal no muscle pain   Neurologic no dizziness, no falls         No Known Allergies      Current Outpatient Medications:     bisoprolol (ZEBeta) 5 MG tablet, TAKE 1 TABLET BY MOUTH DAILY, Disp: 90 tablet, Rfl: 0    ibuprofen (ADVIL,MOTRIN) 200 MG tablet, Take 4 tablets by mouth 2 (Two) Times a Day., Disp: , Rfl:     sertraline (ZOLOFT) 50 MG tablet, TAKE ONE-HALF TABLET BY MOUTH  DAILY X 1 WEEK, THEN INCREASE TO 1 TABLET DAILY, Disp: 90 tablet, Rfl: 0    Vitals:    07/14/25 0954   BP: 114/70   BP Location: Left arm   Patient Position: Sitting   Pulse: 61   SpO2: 98%   Weight: 86.2 kg (190 lb)   Height: 162.6 cm (64\")     Body mass index is 32.61 kg/m².    PHYSICAL EXAM:    General Appearance:   · well developed  · well nourished  Neck:  · thyroid not enlarged  · supple  Respiratory:  · no respiratory distress  · normal breath sounds  · no rales  Cardiovascular:  · no jugular venous distention  · regular rhythm  · apical impulse normal  · S1 normal, S2 normal  · no S3, no S4   · no murmur  · no rub, no thrill  · carotid pulses normal; no bruit  · pedal pulses normal  · lower extremity edema: none    Skin:   warm, dry      RESULTS:   Procedures    Results for orders placed during the hospital encounter of 04/26/24    Adult Transthoracic Echo Complete W/ Cont if Necessary Per Protocol 04/26/2024  2:44 PM    Interpretation Summary    Left ventricular systolic function is normal. Calculated left ventricular EF = 60.5% Left ventricular " "ejection fraction appears to be 56 - 60%.    Left ventricular diastolic function was normal.    Estimated right ventricular systolic pressure from tricuspid regurgitation is normal (<35 mmHg). Calculated right ventricular systolic pressure from tricuspid regurgitation is 28 mmHg.    No significant structural or functional valvular disease.        Labs:  Lab Results   Component Value Date    CHOL 216 (H) 04/26/2024    TRIG 103 04/26/2024    HDL 46 04/26/2024     (H) 04/26/2024    AST 20 04/26/2024    ALT 19 04/26/2024     Lab Results   Component Value Date    HGBA1C 5.30 06/21/2021     No components found for: \"CREATINININE\"  eGFR Non  Amer   Date Value Ref Range Status   06/21/2021 60 (L) >60 mL/min/1.73 Final       Most recent PCP note, imaging tests, and labs reviewed.    ASSESSMENT:  Problem List Items Addressed This Visit       Frequent PVCs - Primary (Chronic)    Overview   Holter placed 4/26/24 at [facility] er            PLAN:    1.  Chest pain:  Benign workup with low risk stress test and echo 5/2024  Continue healthy lifestyle measures  No further symptoms    2.  PVCs:  Continue adequate hydration and beta-blocker  13% on Holter 5/2024  Started bisoprolol 4/2024 with good control of palpitations but she is having some exercise intolerance that she thinks may be due to the beta-blocker 7/2025    Discussed possibly switching to flecainide but at this time she would like to hold off and will work on cutting down bisoprolol on her own.    She will call us or message us if symptoms worsen and she would like to switch to flecainide at which time we will prescribe flecainide 50 mg twice daily with EKG 5 to 7 days later and stop bisoprolol at that time    3.  Hyperlipidemia:  Lipid panel 4/26/2024 showed total cholesterol 216, HDL 46   Continue low saturated fat diet.        Return to clinic in 12 months, or sooner as needed.    Thank you for the opportunity to share in the care of your patient; " please do not hesitate to call me with any questions.     Brock Nelson MD, [facility], [facility]  Office: [phone] [address] [place] [address]    07/14/25

## 2025-07-25 ENCOUNTER — OFFICE VISIT (OUTPATIENT)
Dept: INTERNAL MEDICINE | Facility: CLINIC | Age: 65
End: 2025-07-25
Payer: COMMERCIAL

## 2025-07-25 ENCOUNTER — LAB (OUTPATIENT)
Dept: INTERNAL MEDICINE | Facility: CLINIC | Age: 65
End: 2025-07-25
Payer: COMMERCIAL

## 2025-07-25 VITALS
BODY MASS INDEX: 34.59 KG/M2 | HEIGHT: 64 IN | OXYGEN SATURATION: 96 % | WEIGHT: 202.6 LBS | HEART RATE: 57 BPM | DIASTOLIC BLOOD PRESSURE: 84 MMHG | RESPIRATION RATE: 12 BRPM | SYSTOLIC BLOOD PRESSURE: 134 MMHG | TEMPERATURE: 97.7 F

## 2025-07-25 DIAGNOSIS — Z00.00 ROUTINE GENERAL MEDICAL EXAMINATION AT A HEALTH CARE FACILITY: Primary | ICD-10-CM

## 2025-07-25 DIAGNOSIS — Z00.00 ROUTINE GENERAL MEDICAL EXAMINATION AT A HEALTH CARE FACILITY: ICD-10-CM

## 2025-07-25 DIAGNOSIS — M17.0 BILATERAL PRIMARY OSTEOARTHRITIS OF KNEE: ICD-10-CM

## 2025-07-25 DIAGNOSIS — E78.00 PURE HYPERCHOLESTEROLEMIA: ICD-10-CM

## 2025-07-25 DIAGNOSIS — Z12.11 COLON CANCER SCREENING: ICD-10-CM

## 2025-07-25 LAB
ALBUMIN SERPL-MCNC: 4.2 G/DL (ref 3.5–5.2)
ALBUMIN/GLOB SERPL: 1.1 G/DL
ALP SERPL-CCNC: 53 U/L (ref 39–117)
ALT SERPL W P-5'-P-CCNC: 21 U/L (ref 1–33)
ANION GAP SERPL CALCULATED.3IONS-SCNC: 14 MMOL/L (ref 5–15)
AST SERPL-CCNC: 21 U/L (ref 1–32)
BILIRUB BLD-MCNC: ABNORMAL MG/DL
BILIRUB SERPL-MCNC: 0.7 MG/DL (ref 0–1.2)
BUN SERPL-MCNC: 17 MG/DL (ref 8–23)
BUN/CREAT SERPL: 18.7 (ref 7–25)
CALCIUM SPEC-SCNC: 9.5 MG/DL (ref 8.6–10.5)
CHLORIDE SERPL-SCNC: 104 MMOL/L (ref 98–107)
CHOLEST SERPL-MCNC: 210 MG/DL (ref 0–200)
CLARITY, POC: CLEAR
CO2 SERPL-SCNC: 22 MMOL/L (ref 22–29)
COLOR UR: YELLOW
CREAT SERPL-MCNC: 0.91 MG/DL (ref 0.57–1)
DEPRECATED RDW RBC AUTO: 43.4 FL (ref 37–54)
EGFRCR SERPLBLD CKD-EPI 2021: 70.6 ML/MIN/1.73
ERYTHROCYTE [DISTWIDTH] IN BLOOD BY AUTOMATED COUNT: 15 % (ref 12.3–15.4)
GLOBULIN UR ELPH-MCNC: 3.9 GM/DL
GLUCOSE SERPL-MCNC: 87 MG/DL (ref 65–99)
GLUCOSE UR STRIP-MCNC: NEGATIVE MG/DL
HCT VFR BLD AUTO: 42.4 % (ref 34–46.6)
HDLC SERPL-MCNC: 42 MG/DL (ref 40–60)
HGB BLD-MCNC: 14 G/DL (ref 12–15.9)
KETONES UR QL: NEGATIVE
LDLC SERPL CALC-MCNC: 137 MG/DL (ref 0–100)
LDLC/HDLC SERPL: 3.19 {RATIO}
LEUKOCYTE EST, POC: ABNORMAL
MCH RBC QN AUTO: 26.8 PG (ref 26.6–33)
MCHC RBC AUTO-ENTMCNC: 33 G/DL (ref 31.5–35.7)
MCV RBC AUTO: 81.1 FL (ref 79–97)
NITRITE UR-MCNC: NEGATIVE MG/ML
PH UR: 6 [PH] (ref 5–8)
PLATELET # BLD AUTO: 218 10*3/MM3 (ref 140–450)
PMV BLD AUTO: 10.2 FL (ref 6–12)
POTASSIUM SERPL-SCNC: 4.6 MMOL/L (ref 3.5–5.2)
PROT SERPL-MCNC: 8.1 G/DL (ref 6–8.5)
PROT UR STRIP-MCNC: NEGATIVE MG/DL
RBC # BLD AUTO: 5.23 10*6/MM3 (ref 3.77–5.28)
RBC # UR STRIP: ABNORMAL /UL
SODIUM SERPL-SCNC: 140 MMOL/L (ref 136–145)
SP GR UR: 1.02 (ref 1–1.03)
TRIGL SERPL-MCNC: 170 MG/DL (ref 0–150)
TSH SERPL DL<=0.05 MIU/L-ACNC: 1.8 UIU/ML (ref 0.27–4.2)
UROBILINOGEN UR QL: ABNORMAL
VLDLC SERPL-MCNC: 31 MG/DL (ref 5–40)
WBC NRBC COR # BLD AUTO: 6.28 10*3/MM3 (ref 3.4–10.8)

## 2025-07-25 PROCEDURE — 80061 LIPID PANEL: CPT | Performed by: INTERNAL MEDICINE

## 2025-07-25 PROCEDURE — 83036 HEMOGLOBIN GLYCOSYLATED A1C: CPT | Performed by: INTERNAL MEDICINE

## 2025-07-25 PROCEDURE — 36415 COLL VENOUS BLD VENIPUNCTURE: CPT | Performed by: INTERNAL MEDICINE

## 2025-07-25 PROCEDURE — 80050 GENERAL HEALTH PANEL: CPT | Performed by: INTERNAL MEDICINE

## 2025-07-25 NOTE — LETTER
[facility]  Vaccine Consent Form    Patient Name:  Alyssia Moody  Patient :  1960     Vaccine(s) Ordered    Shingrix Vaccine        Screening Checklist  The following questions should be completed prior to vaccination. If you answer “yes” to any question, it does not necessarily mean you should not be vaccinated. It just means we may need to clarify or ask more questions. If a question is unclear, please ask your healthcare provider to explain it.    Yes No   Any fever or moderate to severe illness today (mild illness and/or antibiotic treatment are not contraindications)?     Do you have a history of a serious reaction to any previous vaccinations, such as anaphylaxis, encephalopathy within 7 days, Guillain-[place] syndrome within 6 weeks, seizure?     Have you received any live vaccine(s) (e.g MMR, MARCELLE) or any other vaccines in the last month (to ensure duplicate doses aren't given)?     Do you have an anaphylactic allergy to latex (DTaP, DTaP-IPV, Hep A, Hep B, MenB, RV, Td, Tdap), baker’s yeast (Hep B, HPV), polysorbates (RSV, nirsevimab, PCV 20 and 21, Rotavirrus, Tdap, Shingrix), or gelatin (MARCELLE, MMR)?     Do you have an anaphylactic allergy to neomycin (Rabies, MARCELLE, MMR, IPV, Hep A), polymyxin B (IPV), or streptomycin (IPV)?      Any cancer, leukemia, AIDS, or other immune system disorder? (MARCELLE, MMR, RV)     Do you have a parent, brother, or sister with an immune system problem (if immune competence of vaccine recipient clinically verified, can proceed)? (MMR, MARCELLE)     Any recent steroid treatments for >2 weeks, chemotherapy, or radiation treatment? (MARCELLE, MMR)     Have you received antibody-containing blood transfusions or IVIG in the past 11 months (recommended interval is dependent on product)? (MMR, MARCELLE)     Have you taken antiviral drugs (acyclovir, famciclovir, valacyclovir for MARCELLE) in the last 24 or 48 hours, respectively?      Are you pregnant or planning to become pregnant within 1 month?  "(MARCELLE, MMR, HPV, IPV, MenB, Abrexvy; For Hep B- refer to Engerix-B; For RSV - Abrysvo is indicated for 32-36 weeks of pregnancy from September to January)     For infants, have you ever been told your child has had intussusception or a medical emergency involving obstruction of the intestine (Rotavirus)? If not for an infant, can skip this question.         *Ordering Physicians/APC should be consulted if \"yes\" is checked by the patient or guardian above.  I have received, read, and understand the Vaccine Information Statement (VIS) for each vaccine ordered.  I have considered my or my child's health status as well as the health status of my close contacts.  I have taken the opportunity to discuss my vaccine questions with my or my child's health care provider.   I have requested that the ordered vaccine(s) be given to me or my child.  I understand the benefits and risks of the vaccines.  I understand that I should remain in the clinic for 15 minutes after receiving the vaccine(s).  _________________________________________________________  Signature of Patient or Parent/Legal Guardian ____________________  Date   "

## 2025-07-25 NOTE — PROGRESS NOTES
Here for physical    Exercise - rides horse daily; will start doing pool therapy as well  Diet- not great over the last 2 1/2 yrs - more carbs/junk food. she drinks 1 20 oz diet coke. No vitamins currently  ETOH - none    Depression-she is currently on Zoloft 50 mg 1 daily.  Will occasionally try to take less but does feel like she still needs to stay on it.  Does not want to go up on the dose    Knee osteoarthritis-R x-ray done in 2021 did show advanced arthritis changes. Both knees give her trouble. She will take ibuprofen 200 - 3-4 daily, but not every day    She has been having pain in L neck/arm pain over the last 3 months - felt like myfascial pain syndrome. Started improving 3 weeks ago. Was doing the ibuprofen more often then    PVCs-she has been on bisoprolol and sees Dr. Nelson.  Saw him earlier this month and they did discuss flecainide since she is having some exercise intolerance with the bisoprolol. When she is recovering from exercise she feels weak and diaphoretic  She did check glc when she had these episodes w/ a CGM and glc was OK.  She occasionally has a high glucose in the 170s but that is usually associated with eating something    She had a rash on her face recently that she felt was a reaction to makeup but also would like to get in with dermatology      Current Outpatient Medications on File Prior to Visit   Medication Sig Dispense Refill    bisoprolol (ZEBeta) 5 MG tablet TAKE 1 TABLET BY MOUTH DAILY 90 tablet 0    ibuprofen (ADVIL,MOTRIN) 200 MG tablet Take 4 tablets by mouth 2 (Two) Times a Day.      sertraline (ZOLOFT) 50 MG tablet TAKE ONE-HALF TABLET BY MOUTH  DAILY X 1 WEEK, THEN INCREASE TO 1 TABLET DAILY 90 tablet 0     No current facility-administered medications on file prior to visit.       The following portions of the patient's history were reviewed and updated as appropriate: allergies, current medications, past family history, past medical history, past social history, past  "surgical history and problem list.    Review of Systems   Constitutional:  Positive for diaphoresis (after exercise). Negative for activity change, appetite change, fever, unexpected weight gain and unexpected weight loss.   HENT: Negative.     Eyes: Negative.    Respiratory:  Negative for shortness of breath and wheezing.    Cardiovascular:  Positive for chest pain (associated w/ anxiety) and palpitations. Negative for leg swelling.   Gastrointestinal: Negative.    Endocrine: Negative.    Genitourinary:  Negative for difficulty urinating and dysuria.   Musculoskeletal:  Positive for arthralgias and neck pain.   Skin:  Positive for rash (recently on face).   Allergic/Immunologic: Negative for immunocompromised state.   Neurological:  Negative for seizures, speech difficulty, memory problem and confusion.   Hematological:  Does not bruise/bleed easily.   Psychiatric/Behavioral:  Positive for depressed mood (on zoloft). Negative for agitation.        Objective   /84 (BP Location: Left arm, Patient Position: Sitting, Cuff Size: Adult)   Pulse 57   Temp 97.7 °F (36.5 °C) (Infrared)   Resp 12   Ht 162.6 cm (64\")   Wt 91.9 kg (202 lb 9.6 oz)   SpO2 96%   BMI 34.78 kg/m²   Physical Exam   Physical Exam  Vitals and nursing note reviewed.   Constitutional:       General: She is not in acute distress.     Appearance: She is well-developed. She is not diaphoretic.   HENT:      Head: Normocephalic and atraumatic.      Right Ear: External ear normal.      Left Ear: External ear normal.      Nose: Nose normal.      Mouth/Throat:      Pharynx: No oropharyngeal exudate.   Eyes:      General: No scleral icterus.        Right eye: No discharge.         Left eye: No discharge.      Conjunctiva/sclera: Conjunctivae normal.      Pupils: Pupils are equal, round, and reactive to light.   Neck:      Thyroid: No thyromegaly.   Cardiovascular:      Rate and Rhythm: Normal rate and regular rhythm.      Heart sounds: Normal heart " sounds. No murmur heard.     No friction rub. No gallop.   Pulmonary:      Effort: Pulmonary effort is normal. No respiratory distress.      Breath sounds: Normal breath sounds. No wheezing or rales.   Chest:   Breasts:     Right: No mass, nipple discharge, skin change or tenderness.      Left: No mass, nipple discharge, skin change or tenderness.   Abdominal:      General: Bowel sounds are normal. There is no distension.      Palpations: Abdomen is soft. There is no mass.      Tenderness: There is no abdominal tenderness. There is no guarding or rebound.   Musculoskeletal:         General: No deformity. Normal range of motion.      Cervical back: Normal range of motion and neck supple.   Lymphadenopathy:      Cervical: No cervical adenopathy.   Skin:     General: Skin is warm and dry.      Coloration: Skin is not pale.      Findings: No erythema or rash.   Neurological:      Mental Status: She is alert and oriented to person, place, and time.      Coordination: Coordination normal.      Deep Tendon Reflexes: Reflexes normal.   Psychiatric:         Behavior: Behavior normal.         Thought Content: Thought content normal.         Judgment: Judgment normal.         Assessment/Plan   Diagnoses and all orders for this visit:    1. Routine general medical examination at a health care facility (Primary)  -     POCT urinalysis dipstick, manual  -     CBC (No Diff); Future  -     TSH Rfx On Abnormal To Free T4; Future  -     Lipid Panel; Future  -     Comprehensive Metabolic Panel; Future  -     Hemoglobin A1c; Future  Regular exercise, healthy diet. BSE qmonth. Sunscreen use encouraged. Check fasting labs.   Soham due 11/25  DT - she had in '21-we will repeat in 2031  Shingrix-got the first 1 a year ago -   Prevnar 21- we will do at a later date  DEXA-due now (osteopenia in 2022)- she declines  Pap done in 2024 and was normal  Colonoscopy-did Cologuard in 2021.  Discussed options today-she would like to go ahead and get  the colonoscopy scheduled and we will order  Dermatology self-referral form given to patient    2. Bilateral primary osteoarthritis of knee-patient would like to go ahead and consult with an orthopedic and we will put in referral  -     Ambulatory Referral to Orthopedic Surgery    3. Colon cancer screening  -     Ambulatory Referral For Screening Colonoscopy    Other orders  -     Shingrix Vaccine

## 2025-07-26 LAB — HBA1C MFR BLD: 6 % (ref 4.8–5.6)

## 2025-08-06 ENCOUNTER — OFFICE VISIT (OUTPATIENT)
Dept: ORTHOPEDIC SURGERY | Facility: CLINIC | Age: 65
End: 2025-08-06
Payer: COMMERCIAL

## 2025-08-06 VITALS
BODY MASS INDEX: 34.66 KG/M2 | SYSTOLIC BLOOD PRESSURE: 126 MMHG | WEIGHT: 203 LBS | DIASTOLIC BLOOD PRESSURE: 84 MMHG | HEIGHT: 64 IN

## 2025-08-06 DIAGNOSIS — M17.32 POST-TRAUMATIC OSTEOARTHRITIS OF LEFT KNEE: ICD-10-CM

## 2025-08-06 DIAGNOSIS — M17.11 PRIMARY OSTEOARTHRITIS OF RIGHT KNEE: Primary | ICD-10-CM

## 2025-08-06 PROBLEM — M17.12 DEGENERATIVE ARTHRITIS OF LEFT KNEE: Status: ACTIVE | Noted: 2025-08-06

## 2025-08-06 PROCEDURE — 99204 OFFICE O/P NEW MOD 45 MIN: CPT | Performed by: ORTHOPAEDIC SURGERY

## 2025-08-06 RX ORDER — CHLORHEXIDINE GLUCONATE 40 MG/ML
1 SOLUTION TOPICAL DAILY
Qty: 236 ML | Refills: 0 | Status: SHIPPED | OUTPATIENT
Start: 2025-08-06